# Patient Record
Sex: MALE | Race: WHITE | NOT HISPANIC OR LATINO | Employment: OTHER | URBAN - METROPOLITAN AREA
[De-identification: names, ages, dates, MRNs, and addresses within clinical notes are randomized per-mention and may not be internally consistent; named-entity substitution may affect disease eponyms.]

---

## 2017-09-12 ENCOUNTER — HISTORICAL (OUTPATIENT)
Dept: ADMINISTRATIVE | Facility: HOSPITAL | Age: 77
End: 2017-09-12

## 2017-10-17 ENCOUNTER — HISTORICAL (OUTPATIENT)
Dept: ADMINISTRATIVE | Facility: HOSPITAL | Age: 77
End: 2017-10-17

## 2019-01-25 ENCOUNTER — HISTORICAL (OUTPATIENT)
Dept: RADIOLOGY | Facility: HOSPITAL | Age: 79
End: 2019-01-25

## 2019-01-25 LAB
TSH SERPL-ACNC: 2.39 MIU/L (ref 0.36–3.74)
VIT B12 SERPL-MCNC: 1185 PG/ML (ref 193–986)

## 2019-03-28 ENCOUNTER — HISTORICAL (OUTPATIENT)
Dept: LAB | Facility: HOSPITAL | Age: 79
End: 2019-03-28

## 2019-03-28 LAB
ABS NEUT (OLG): 7.07 X10(3)/MCL (ref 2.1–9.2)
BASOPHILS # BLD AUTO: 0 X10(3)/MCL (ref 0–0.2)
BASOPHILS NFR BLD AUTO: 0 %
ERYTHROCYTE [DISTWIDTH] IN BLOOD BY AUTOMATED COUNT: 14.5 % (ref 11.5–17)
HCT VFR BLD AUTO: 45.1 % (ref 42–52)
HGB BLD-MCNC: 14.7 GM/DL (ref 14–18)
LYMPHOCYTES # BLD AUTO: 0.6 X10(3)/MCL (ref 0.6–4.6)
LYMPHOCYTES NFR BLD AUTO: 7 %
MCH RBC QN AUTO: 30.9 PG (ref 27–31)
MCHC RBC AUTO-ENTMCNC: 32.6 GM/DL (ref 33–36)
MCV RBC AUTO: 94.9 FL (ref 80–94)
MONOCYTES # BLD AUTO: 0.2 X10(3)/MCL (ref 0.1–1.3)
MONOCYTES NFR BLD AUTO: 2 %
NEUTROPHILS # BLD AUTO: 7.07 X10(3)/MCL (ref 2.1–9.2)
NEUTROPHILS NFR BLD AUTO: 90 %
PLATELET # BLD AUTO: 153 X10(3)/MCL (ref 130–400)
PMV BLD AUTO: 10.2 FL (ref 9.4–12.4)
RBC # BLD AUTO: 4.75 X10(6)/MCL (ref 4.7–6.1)
WBC # SPEC AUTO: 7.8 X10(3)/MCL (ref 4.5–11.5)

## 2022-04-07 ENCOUNTER — HISTORICAL (OUTPATIENT)
Dept: ADMINISTRATIVE | Facility: HOSPITAL | Age: 82
End: 2022-04-07
Payer: MEDICARE

## 2022-04-24 VITALS
HEIGHT: 72 IN | OXYGEN SATURATION: 96 % | SYSTOLIC BLOOD PRESSURE: 98 MMHG | WEIGHT: 190.69 LBS | BODY MASS INDEX: 25.83 KG/M2 | DIASTOLIC BLOOD PRESSURE: 68 MMHG

## 2022-04-30 NOTE — OP NOTE
Patient:   Asael Ramos            MRN: [id number]            FIN: [id number]               Age:   77 years     Sex:  Male     :  1940   Associated Diagnoses:   None   Author:   Praveen Kc MD       Phacoemulsification of Cataract with Intraocular Implant   Preoperative Diagnosis: Cataract right eye   Postoperative Diagnosis : Cataract right eye  Surgeon: Praveen Kc MD  Assistant: SELINA Peralta  Anestheisa: Topical  Complications: None  After the patient underwent topical anesthesia along with IV sedation in the holding area, the patient was brought to the operating suite. The patient was prepped and draped in a sterile fashion. A pediatric tegaderm and a lid speculum were used to retract the upper and lower lashes and lids.  A 1.0mm paracentesis was then made at the 11 oclock position. Intraocular non-preserved 1% Xylocaine (4% diluted down to 1%) was irrigated into the anterior chamber. Trypan blue dye was used to stain the anterior capsule then Endocoat was injected into the eye. A clear corneal incision was made with a 2.4 Keratome blade. A 4.75mm circular capsulotomy was then made with a pre bent 30 gauge needle and BSS was used to hydro dissect the nucleus from the capsule. The nucleus was then phacoemulsified with the Abbott machine for a EFX of 73. The cortex was then removed with the I/A hand piece and Helon was placed into the posterior bag of the eye. An posterior chamber implant [id number] of power 13.0 was placed in the capsular bag. The Helon was then removed from the eye with the I/A hand piece . The anterior chamber was inflated with BSS and the wound was checked for leaks. The lid speculum was removed and a drop of Besivance was placed in the operative eye. The patient was brought to the recovery suite in stable condition.         d.o.s. 2017 @ [facility] [place]

## 2022-04-30 NOTE — OP NOTE
Patient:   Asael Ramos            MRN: [id number]            FIN: [id number]               Age:   77 years     Sex:  Male     :  1940   Associated Diagnoses:   None   Author:   Praveen Kc MD       Phacoemulsification of Cataract with Intraocular Implant   Preoperative Diagnosis: Cataract left eye   Postoperative Diagnosis : Cataract left eye  Surgeon: Praveen Kc MD  Assistant: SELINA Peralta  Anestheisa: Topical  Complications: None    After the patient underwent topical anesthesia along with IV sedation in the holding area, the patient was brought to the operating suite. The patient prepped and draped in a sterile fashion. A pediatric tegaderm and a lid speculum were used to retract the upper and lower lashes and lids.  A 1.0mm paracentesis was then made at the 5 oclock and 11 oclock position. Intraocular non-preserved 1% Xylocaine (4% diluted down to 1%) was irrigated into the anterior chamber. Trypan blue dye was used to stain the anterior capsule then Endocoat was injected into the eye. A clear corneal incision was made with a 2.4 Keratome blade. A 4.75mm circular capsulotomy was then made with a pre bent 30 gauge needle and BSS was used to hydro dissect the nucleus from the capsule. The nucleus was then phacoemulsified with the Abbott machine for a EFX of 71. The cortex was then removed with the I/A hand piece and Helon was placed into the posterior bag of the eye. An posterior chamber implant [id number] of power 11.0 was placed in the capsular bag. The Helon was then removed from the eye with the I/A hand piece . The anterior chamber was inflated with BSS and the wound was checked for leaks. The lid speculum was removed and a drop of Besivance was placed in the operative eye. The patient was brought to the recovery suite in stable condition.         10/17/2017 @ [facility]

## 2022-08-25 ENCOUNTER — TELEPHONE (OUTPATIENT)
Dept: INTERNAL MEDICINE | Facility: CLINIC | Age: 82
End: 2022-08-25
Payer: MEDICARE

## 2022-08-29 ENCOUNTER — TELEPHONE (OUTPATIENT)
Dept: INTERNAL MEDICINE | Facility: CLINIC | Age: 82
End: 2022-08-29
Payer: MEDICARE

## 2022-08-29 NOTE — TELEPHONE ENCOUNTER
----- Message from Eliana Mcgrath sent at 8/29/2022 11:38 AM CDT -----  Regarding: needs to know if needs labs for next appt  Patient needs to know if he needs to go do labs before next appt. Call him at [phone]

## 2022-08-29 NOTE — TELEPHONE ENCOUNTER
Attempted to contact pt n/a, please let pt know no labs needed at this moment if  says otherwise. Thank you!

## 2022-10-10 DIAGNOSIS — Z12.5 SCREENING FOR PROSTATE CANCER: Primary | ICD-10-CM

## 2022-10-10 DIAGNOSIS — E78.5 HYPERLIPIDEMIA, UNSPECIFIED HYPERLIPIDEMIA TYPE: ICD-10-CM

## 2022-10-10 DIAGNOSIS — I10 HYPERTENSION, UNSPECIFIED TYPE: ICD-10-CM

## 2022-10-12 ENCOUNTER — TELEPHONE (OUTPATIENT)
Dept: INTERNAL MEDICINE | Facility: CLINIC | Age: 82
End: 2022-10-12
Payer: MEDICARE

## 2022-10-18 ENCOUNTER — LAB VISIT (OUTPATIENT)
Dept: LAB | Facility: HOSPITAL | Age: 82
End: 2022-10-18
Attending: INTERNAL MEDICINE
Payer: MEDICARE

## 2022-10-18 DIAGNOSIS — I10 HYPERTENSION, UNSPECIFIED TYPE: ICD-10-CM

## 2022-10-18 DIAGNOSIS — Z12.5 SCREENING FOR PROSTATE CANCER: ICD-10-CM

## 2022-10-18 DIAGNOSIS — E78.5 HYPERLIPIDEMIA, UNSPECIFIED HYPERLIPIDEMIA TYPE: ICD-10-CM

## 2022-10-18 LAB
ALBUMIN SERPL-MCNC: 3.8 GM/DL (ref 3.4–4.8)
ALBUMIN/GLOB SERPL: 1.4 RATIO (ref 1.1–2)
ALP SERPL-CCNC: 126 UNIT/L (ref 40–150)
ALT SERPL-CCNC: 25 UNIT/L (ref 0–55)
AST SERPL-CCNC: 30 UNIT/L (ref 5–34)
BASOPHILS # BLD AUTO: 0.04 X10(3)/MCL (ref 0–0.2)
BASOPHILS NFR BLD AUTO: 0.7 %
BILIRUBIN DIRECT+TOT PNL SERPL-MCNC: 1.6 MG/DL
BUN SERPL-MCNC: 15.5 MG/DL (ref 8.4–25.7)
CALCIUM SERPL-MCNC: 9.5 MG/DL (ref 8.8–10)
CHLORIDE SERPL-SCNC: 105 MMOL/L (ref 98–107)
CHOLEST SERPL-MCNC: 174 MG/DL
CHOLEST/HDLC SERPL: 4 {RATIO} (ref 0–5)
CO2 SERPL-SCNC: 26 MMOL/L (ref 23–31)
CREAT SERPL-MCNC: 0.95 MG/DL (ref 0.73–1.18)
EOSINOPHIL # BLD AUTO: 0.13 X10(3)/MCL (ref 0–0.9)
EOSINOPHIL NFR BLD AUTO: 2.4 %
ERYTHROCYTE [DISTWIDTH] IN BLOOD BY AUTOMATED COUNT: 14.9 % (ref 11.5–17)
GFR SERPLBLD CREATININE-BSD FMLA CKD-EPI: >60 MLS/MIN/1.73/M2
GLOBULIN SER-MCNC: 2.7 GM/DL (ref 2.4–3.5)
GLUCOSE SERPL-MCNC: 91 MG/DL (ref 82–115)
HCT VFR BLD AUTO: 43.2 % (ref 42–52)
HDLC SERPL-MCNC: 49 MG/DL (ref 35–60)
HGB BLD-MCNC: 13.9 GM/DL (ref 14–18)
IMM GRANULOCYTES # BLD AUTO: 0.02 X10(3)/MCL (ref 0–0.04)
IMM GRANULOCYTES NFR BLD AUTO: 0.4 %
LDLC SERPL CALC-MCNC: 111 MG/DL (ref 50–140)
LYMPHOCYTES # BLD AUTO: 1.22 X10(3)/MCL (ref 0.6–4.6)
LYMPHOCYTES NFR BLD AUTO: 22.3 %
MCH RBC QN AUTO: 31.1 PG (ref 27–31)
MCHC RBC AUTO-ENTMCNC: 32.2 MG/DL (ref 33–36)
MCV RBC AUTO: 96.6 FL (ref 80–94)
MONOCYTES # BLD AUTO: 0.47 X10(3)/MCL (ref 0.1–1.3)
MONOCYTES NFR BLD AUTO: 8.6 %
NEUTROPHILS # BLD AUTO: 3.6 X10(3)/MCL (ref 2.1–9.2)
NEUTROPHILS NFR BLD AUTO: 65.6 %
NRBC BLD AUTO-RTO: 0 %
PLATELET # BLD AUTO: 125 X10(3)/MCL (ref 130–400)
PMV BLD AUTO: 10.3 FL (ref 7.4–10.4)
POTASSIUM SERPL-SCNC: 4 MMOL/L (ref 3.5–5.1)
PROT SERPL-MCNC: 6.5 GM/DL (ref 5.8–7.6)
PSA SERPL-MCNC: 6.06 NG/ML
RBC # BLD AUTO: 4.47 X10(6)/MCL (ref 4.7–6.1)
SODIUM SERPL-SCNC: 138 MMOL/L (ref 136–145)
TRIGL SERPL-MCNC: 72 MG/DL (ref 34–140)
TSH SERPL-ACNC: 3.03 UIU/ML (ref 0.35–4.94)
VLDLC SERPL CALC-MCNC: 14 MG/DL
WBC # SPEC AUTO: 5.5 X10(3)/MCL (ref 4.5–11.5)

## 2022-10-18 PROCEDURE — 80053 COMPREHEN METABOLIC PANEL: CPT

## 2022-10-18 PROCEDURE — 84153 ASSAY OF PSA TOTAL: CPT

## 2022-10-18 PROCEDURE — 36415 COLL VENOUS BLD VENIPUNCTURE: CPT

## 2022-10-18 PROCEDURE — 85025 COMPLETE CBC W/AUTO DIFF WBC: CPT

## 2022-10-18 PROCEDURE — 84443 ASSAY THYROID STIM HORMONE: CPT

## 2022-10-18 PROCEDURE — 80061 LIPID PANEL: CPT

## 2022-10-19 ENCOUNTER — OFFICE VISIT (OUTPATIENT)
Dept: INTERNAL MEDICINE | Facility: CLINIC | Age: 82
End: 2022-10-19
Payer: MEDICARE

## 2022-10-19 VITALS
TEMPERATURE: 98 F | HEART RATE: 79 BPM | DIASTOLIC BLOOD PRESSURE: 72 MMHG | SYSTOLIC BLOOD PRESSURE: 114 MMHG | OXYGEN SATURATION: 97 % | HEIGHT: 71 IN | WEIGHT: 189 LBS | BODY MASS INDEX: 26.46 KG/M2

## 2022-10-19 DIAGNOSIS — Z23 NEED FOR VACCINATION: Primary | ICD-10-CM

## 2022-10-19 DIAGNOSIS — L29.9 ITCHING: Primary | ICD-10-CM

## 2022-10-19 DIAGNOSIS — E78.2 MIXED HYPERLIPIDEMIA: ICD-10-CM

## 2022-10-19 DIAGNOSIS — I48.0 PAROXYSMAL ATRIAL FIBRILLATION: ICD-10-CM

## 2022-10-19 PROCEDURE — 90694 FLU VACCINE - QUADRIVALENT - ADJUVANTED: ICD-10-PCS | Mod: ,,, | Performed by: INTERNAL MEDICINE

## 2022-10-19 PROCEDURE — 3288F FALL RISK ASSESSMENT DOCD: CPT | Mod: CPTII,,, | Performed by: INTERNAL MEDICINE

## 2022-10-19 PROCEDURE — 1126F AMNT PAIN NOTED NONE PRSNT: CPT | Mod: CPTII,,, | Performed by: INTERNAL MEDICINE

## 2022-10-19 PROCEDURE — 1159F PR MEDICATION LIST DOCUMENTED IN MEDICAL RECORD: ICD-10-PCS | Mod: CPTII,,, | Performed by: INTERNAL MEDICINE

## 2022-10-19 PROCEDURE — 3074F SYST BP LT 130 MM HG: CPT | Mod: CPTII,,, | Performed by: INTERNAL MEDICINE

## 2022-10-19 PROCEDURE — 1101F PT FALLS ASSESS-DOCD LE1/YR: CPT | Mod: CPTII,,, | Performed by: INTERNAL MEDICINE

## 2022-10-19 PROCEDURE — 3288F PR FALLS RISK ASSESSMENT DOCUMENTED: ICD-10-PCS | Mod: CPTII,,, | Performed by: INTERNAL MEDICINE

## 2022-10-19 PROCEDURE — G0008 ADMIN INFLUENZA VIRUS VAC: HCPCS | Mod: ,,, | Performed by: INTERNAL MEDICINE

## 2022-10-19 PROCEDURE — G0008 FLU VACCINE - QUADRIVALENT - ADJUVANTED: ICD-10-PCS | Mod: ,,, | Performed by: INTERNAL MEDICINE

## 2022-10-19 PROCEDURE — 1160F PR REVIEW ALL MEDS BY PRESCRIBER/CLIN PHARMACIST DOCUMENTED: ICD-10-PCS | Mod: CPTII,,, | Performed by: INTERNAL MEDICINE

## 2022-10-19 PROCEDURE — 3074F PR MOST RECENT SYSTOLIC BLOOD PRESSURE < 130 MM HG: ICD-10-PCS | Mod: CPTII,,, | Performed by: INTERNAL MEDICINE

## 2022-10-19 PROCEDURE — 99214 OFFICE O/P EST MOD 30 MIN: CPT | Mod: ,,, | Performed by: INTERNAL MEDICINE

## 2022-10-19 PROCEDURE — 1160F RVW MEDS BY RX/DR IN RCRD: CPT | Mod: CPTII,,, | Performed by: INTERNAL MEDICINE

## 2022-10-19 PROCEDURE — 1126F PR PAIN SEVERITY QUANTIFIED, NO PAIN PRESENT: ICD-10-PCS | Mod: CPTII,,, | Performed by: INTERNAL MEDICINE

## 2022-10-19 PROCEDURE — 99214 PR OFFICE/OUTPT VISIT, EST, LEVL IV, 30-39 MIN: ICD-10-PCS | Mod: ,,, | Performed by: INTERNAL MEDICINE

## 2022-10-19 PROCEDURE — 3078F PR MOST RECENT DIASTOLIC BLOOD PRESSURE < 80 MM HG: ICD-10-PCS | Mod: CPTII,,, | Performed by: INTERNAL MEDICINE

## 2022-10-19 PROCEDURE — 3078F DIAST BP <80 MM HG: CPT | Mod: CPTII,,, | Performed by: INTERNAL MEDICINE

## 2022-10-19 PROCEDURE — 1101F PR PT FALLS ASSESS DOC 0-1 FALLS W/OUT INJ PAST YR: ICD-10-PCS | Mod: CPTII,,, | Performed by: INTERNAL MEDICINE

## 2022-10-19 PROCEDURE — 90694 VACC AIIV4 NO PRSRV 0.5ML IM: CPT | Mod: ,,, | Performed by: INTERNAL MEDICINE

## 2022-10-19 PROCEDURE — 1159F MED LIST DOCD IN RCRD: CPT | Mod: CPTII,,, | Performed by: INTERNAL MEDICINE

## 2022-10-19 RX ORDER — HYDROXYZINE HYDROCHLORIDE 10 MG/1
10 TABLET, FILM COATED ORAL 3 TIMES DAILY PRN
Qty: 90 TABLET | Refills: 5 | Status: SHIPPED | OUTPATIENT
Start: 2022-10-19

## 2022-10-19 NOTE — ASSESSMENT & PLAN NOTE
Not on a statin, labs reviewed and noted to be essentially normal  Stressed importance of dietary modifications. Follow a low cholesterol, low saturated fat diet with less that 200mg of cholesterol a day.  Avoid fried foods and high saturated fats (high saturated fats less than 7% of calories).  Add Flax Seed/Fish Oil supplements to diet. Increase dietary fiber.  Regular exercise can reduce LDL and raise HDL. Stressed importance of physical activity 5 times per week for 30 minutes per day.

## 2022-10-19 NOTE — PROGRESS NOTES
Subjective:      Patient ID: Asael Ramos is a 82 y.o. male.    Chief Complaint: Follow-up (9 month)    Mr Vyas, (Smokey) is a 82-year-old gentleman with atrial fibrillation, anticoagulated with Eliquis and MMSE score of 21/30.    Doing well overall and has no acute needs or complaints.  Did not do very well with the Keytruda and is advised to follow-up with his oncologist regarding this.    Records from Oncology will be obtained       ENT: Dr. Wheeler  Oncologist: Dr. Acosta  Dermatology: Dr. Keith  Neurologist: Dr. Richmond  Cardiologist: Dr. Sharif    Wellness:  01/18/2022  Pn: 1029,2017: UTD    The patient's Health Maintenance was reviewed and the following appears to be due at this time:   There are no preventive care reminders to display for this patient.     Past Medical History:  Past Medical History:   Diagnosis Date    A-fib     Brain concussion     CAD (coronary artery disease)     Malignant melanoma of skin, unspecified     Mild cognitive impairment     Mixed hyperlipidemia     Pulmonary hypertension     Shingles     Sleep apnea, unspecified      Past Surgical History:   Procedure Laterality Date    CATARACT EXTRACTION      CORONARY STENT PLACEMENT      HIP REPLACEMENT ARTHROPLASTY      TOTAL SHOULDER ARTHROPLASTY Bilateral      Review of patient's allergies indicates:  No Known Allergies  Social History     Socioeconomic History    Marital status:    Tobacco Use    Smoking status: Never    Smokeless tobacco: Never   Substance and Sexual Activity    Alcohol use: Yes     Alcohol/week: 2.0 standard drinks     Types: 1 Glasses of wine, 1 Cans of beer per week     Family History   Problem Relation Age of Onset    Breast cancer Mother     Cancer Brother        Review of Systems    A comprehensive review of systems was performed and is negative except for that stated above  Objective:   /72 (BP Location: Left arm, Patient Position: Sitting, BP Method: Small (Manual))   Pulse 79   Temp 98.4  "°F (36.9 °C) (Temporal)   Ht 5' 11" (1.803 m)   Wt 85.7 kg (189 lb)   SpO2 97%   BMI 26.36 kg/m²     Physical Exam  Constitutional:       Appearance: Normal appearance.   HENT:      Head: Normocephalic and atraumatic.      Nose: Nose normal.      Mouth/Throat:      Mouth: Mucous membranes are moist.      Pharynx: Oropharynx is clear.   Eyes:      Extraocular Movements: Extraocular movements intact.      Pupils: Pupils are equal, round, and reactive to light.   Cardiovascular:      Rate and Rhythm: Normal rate and regular rhythm.      Pulses: Normal pulses.   Pulmonary:      Effort: Pulmonary effort is normal.      Breath sounds: Normal breath sounds.   Abdominal:      General: Bowel sounds are normal.      Palpations: Abdomen is soft.   Musculoskeletal:         General: Normal range of motion.      Cervical back: Normal range of motion and neck supple.   Skin:     General: Skin is warm.   Neurological:      General: No focal deficit present.      Mental Status: He is alert and oriented to person, place, and time. Mental status is at baseline.   Psychiatric:         Mood and Affect: Mood normal.     Assessment/ Plan:   1. Need for vaccination  -     Influenza - Quadrivalent (Adjuvanted)    2. Paroxysmal atrial fibrillation  Assessment & Plan:  -on Eliquis 5 mg p.o. b.i.d. now   -rate and rhythm controlled      3. Mixed hyperlipidemia  Assessment & Plan:    Not on a statin, labs reviewed and noted to be essentially normal  Stressed importance of dietary modifications. Follow a low cholesterol, low saturated fat diet with less that 200mg of cholesterol a day.  Avoid fried foods and high saturated fats (high saturated fats less than 7% of calories).  Add Flax Seed/Fish Oil supplements to diet. Increase dietary fiber.  Regular exercise can reduce LDL and raise HDL. Stressed importance of physical activity 5 times per week for 30 minutes per day.        .  "

## 2022-10-19 NOTE — LETTER
AUTHORIZATION FOR RELEASE OF   CONFIDENTIAL INFORMATION    Dear Dr. Acosta    We are seeing Asael Ramos, date of birth 1940, in the clinic at [place] [address] [facility]. Aline Montoya MD is the patient's PCP. Asael Ramos has an outstanding lab/procedure at the time we reviewed his chart. In order to help keep his health information updated, he has authorized us to request the following medical record(s):        (  )  MAMMOGRAM                                      (  )  COLONOSCOPY      (  )  PAP SMEAR                                          (X)  OUTSIDE LAB RESULTS     (  )  DEXA SCAN                                          (  )  EYE EXAM            (  )  FOOT EXAM                                          (  )  ENTIRE RECORD     (  )  OUTSIDE IMMUNIZATIONS                 (X)  OFFICE NOTES         Please fax records to [facility], Reshma Arun Bhanushali, MD, [phone]               Patient Name: Asael Ramos  : 1940  Patient Phone #: [phone]

## 2022-12-28 ENCOUNTER — HOSPITAL ENCOUNTER (INPATIENT)
Facility: HOSPITAL | Age: 82
LOS: 8 days | Discharge: HOME HEALTH | DRG: 177 | End: 2023-01-05
Attending: INTERNAL MEDICINE | Admitting: INTERNAL MEDICINE
Payer: MEDICARE

## 2022-12-28 DIAGNOSIS — R79.89 ELEVATED TROPONIN: ICD-10-CM

## 2022-12-28 DIAGNOSIS — U07.1 COVID-19: Primary | ICD-10-CM

## 2022-12-28 DIAGNOSIS — R53.1 WEAKNESS: ICD-10-CM

## 2022-12-28 DIAGNOSIS — S32.018A OTHER CLOSED FRACTURE OF FIRST LUMBAR VERTEBRA, INITIAL ENCOUNTER: ICD-10-CM

## 2022-12-28 DIAGNOSIS — I48.0 PAROXYSMAL ATRIAL FIBRILLATION: ICD-10-CM

## 2022-12-28 DIAGNOSIS — I48.91 ATRIAL FIBRILLATION: ICD-10-CM

## 2022-12-28 DIAGNOSIS — U07.1 COVID-19 VIRUS INFECTION: ICD-10-CM

## 2022-12-28 DIAGNOSIS — S09.90XA INJURY OF HEAD, INITIAL ENCOUNTER: ICD-10-CM
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
## 2022-12-28 LAB
ALBUMIN SERPL-MCNC: 3.5 G/DL (ref 3.4–4.8)
ALBUMIN/GLOB SERPL: 1.3 RATIO (ref 1.1–2)
ALP SERPL-CCNC: 106 UNIT/L (ref 40–150)
ALT SERPL-CCNC: 31 UNIT/L (ref 0–55)
AMPHET UR QL SCN: NEGATIVE
APPEARANCE UR: CLEAR
APTT PPP: 31.8 SECONDS (ref 23.2–33.7)
AST SERPL-CCNC: 61 UNIT/L (ref 5–34)
BACTERIA #/AREA URNS AUTO: NORMAL /HPF
BARBITURATE SCN PRESENT UR: NEGATIVE
BASOPHILS # BLD AUTO: 0.01 X10(3)/MCL (ref 0–0.2)
BASOPHILS NFR BLD AUTO: 0.2 %
BENZODIAZ UR QL SCN: NEGATIVE
BILIRUB UR QL STRIP.AUTO: NEGATIVE MG/DL
BILIRUBIN DIRECT+TOT PNL SERPL-MCNC: 1.2 MG/DL
BUN SERPL-MCNC: 18.4 MG/DL (ref 8.4–25.7)
CALCIUM SERPL-MCNC: 9 MG/DL (ref 8.8–10)
CANNABINOIDS UR QL SCN: NEGATIVE
CHLORIDE SERPL-SCNC: 113 MMOL/L (ref 98–107)
CO2 SERPL-SCNC: 18 MMOL/L (ref 23–31)
COCAINE UR QL SCN: NEGATIVE
COLOR UR AUTO: ABNORMAL
CREAT SERPL-MCNC: 0.91 MG/DL (ref 0.73–1.18)
EOSINOPHIL # BLD AUTO: 0 X10(3)/MCL (ref 0–0.9)
EOSINOPHIL NFR BLD AUTO: 0 %
ERYTHROCYTE [DISTWIDTH] IN BLOOD BY AUTOMATED COUNT: 15.5 % (ref 11.6–14.4)
FENTANYL UR QL SCN: NEGATIVE
GFR SERPLBLD CREATININE-BSD FMLA CKD-EPI: >60 MLS/MIN/1.73/M2
GLOBULIN SER-MCNC: 2.7 GM/DL (ref 2.4–3.5)
GLUCOSE SERPL-MCNC: 110 MG/DL (ref 82–115)
GLUCOSE UR QL STRIP.AUTO: NEGATIVE MG/DL
HCT VFR BLD AUTO: 39.2 % (ref 42–52)
HGB BLD-MCNC: 12.6 GM/DL (ref 14–18)
IMM GRANULOCYTES # BLD AUTO: 0.02 X10(3)/MCL (ref 0–0.04)
IMM GRANULOCYTES NFR BLD AUTO: 0.3 %
INR BLD: 1.51 (ref 0–1.3)
KETONES UR QL STRIP.AUTO: ABNORMAL MG/DL
LACTATE SERPL-SCNC: 1.9 MMOL/L (ref 0.5–2.2)
LACTATE SERPL-SCNC: 2.1 MMOL/L (ref 0.5–2.2)
LEUKOCYTE ESTERASE UR QL STRIP.AUTO: NEGATIVE UNIT/L
LYMPHOCYTES # BLD AUTO: 0.61 X10(3)/MCL (ref 0.6–4.6)
LYMPHOCYTES NFR BLD AUTO: 10.6 %
MAGNESIUM SERPL-MCNC: 2 MG/DL (ref 1.6–2.6)
MCH RBC QN AUTO: 31 PG
MCHC RBC AUTO-ENTMCNC: 32.1 MG/DL (ref 33–36)
MCV RBC AUTO: 96.3 FL (ref 80–94)
MDMA UR QL SCN: NEGATIVE
MONOCYTES # BLD AUTO: 0.53 X10(3)/MCL (ref 0.1–1.3)
MONOCYTES NFR BLD AUTO: 9.2 %
NEUTROPHILS # BLD AUTO: 4.6 X10(3)/MCL (ref 2.1–9.2)
NEUTROPHILS NFR BLD AUTO: 79.7 %
NITRITE UR QL STRIP.AUTO: NEGATIVE
NRBC BLD AUTO-RTO: 0 % (ref 0–1)
OPIATES UR QL SCN: NEGATIVE
PCP UR QL: NEGATIVE
PH UR STRIP.AUTO: 5.5 [PH]
PH UR: 5.5 [PH] (ref 3–11)
PLATELET # BLD AUTO: 86 X10(3)/MCL (ref 140–371)
PMV BLD AUTO: 11 FL (ref 9.4–12.4)
POTASSIUM SERPL-SCNC: 4.2 MMOL/L (ref 3.5–5.1)
PROT SERPL-MCNC: 6.2 GM/DL (ref 5.8–7.6)
PROT UR QL STRIP.AUTO: ABNORMAL MG/DL
PROTHROMBIN TIME: 18 SECONDS (ref 12.5–14.5)
RBC # BLD AUTO: 4.07 X10(6)/MCL (ref 4.7–6.1)
RBC #/AREA URNS AUTO: <5 /HPF
RBC UR QL AUTO: ABNORMAL UNIT/L
SARS-COV-2 RDRP RESP QL NAA+PROBE: POSITIVE
SODIUM SERPL-SCNC: 142 MMOL/L (ref 136–145)
SP GR UR STRIP.AUTO: 1.02 (ref 1–1.03)
SPECIFIC GRAVITY, URINE AUTO (.000) (OHS): 1.02 (ref 1–1.03)
SQUAMOUS #/AREA URNS AUTO: <5 /HPF
TROPONIN I SERPL-MCNC: 0.17 NG/ML (ref 0–0.04)
TROPONIN I SERPL-MCNC: 0.21 NG/ML (ref 0–0.04)
UROBILINOGEN UR STRIP-ACNC: 1 MG/DL
WBC # SPEC AUTO: 5.8 X10(3)/MCL (ref 4.5–11.5)
WBC #/AREA URNS AUTO: 5 /HPF

## 2022-12-28 PROCEDURE — 63600175 PHARM REV CODE 636 W HCPCS: Performed by: NURSE PRACTITIONER

## 2022-12-28 PROCEDURE — 93010 ELECTROCARDIOGRAM REPORT: CPT | Mod: ,,, | Performed by: INTERNAL MEDICINE

## 2022-12-28 PROCEDURE — 83735 ASSAY OF MAGNESIUM: CPT | Performed by: NURSE PRACTITIONER

## 2022-12-28 PROCEDURE — 85610 PROTHROMBIN TIME: CPT | Performed by: NURSE PRACTITIONER

## 2022-12-28 PROCEDURE — 25000003 PHARM REV CODE 250: Performed by: NURSE PRACTITIONER

## 2022-12-28 PROCEDURE — 93010 EKG 12-LEAD: ICD-10-PCS | Mod: ,,, | Performed by: INTERNAL MEDICINE

## 2022-12-28 PROCEDURE — 21400001 HC TELEMETRY ROOM

## 2022-12-28 PROCEDURE — 85730 THROMBOPLASTIN TIME PARTIAL: CPT | Performed by: NURSE PRACTITIONER

## 2022-12-28 PROCEDURE — 80307 DRUG TEST PRSMV CHEM ANLYZR: CPT | Performed by: NURSE PRACTITIONER

## 2022-12-28 PROCEDURE — 27000207 HC ISOLATION

## 2022-12-28 PROCEDURE — 96361 HYDRATE IV INFUSION ADD-ON: CPT

## 2022-12-28 PROCEDURE — 96374 THER/PROPH/DIAG INJ IV PUSH: CPT

## 2022-12-28 PROCEDURE — 87635 SARS-COV-2 COVID-19 AMP PRB: CPT | Performed by: NURSE PRACTITIONER

## 2022-12-28 PROCEDURE — 11000001 HC ACUTE MED/SURG PRIVATE ROOM

## 2022-12-28 PROCEDURE — 80053 COMPREHEN METABOLIC PANEL: CPT | Performed by: NURSE PRACTITIONER

## 2022-12-28 PROCEDURE — 81001 URINALYSIS AUTO W/SCOPE: CPT | Performed by: NURSE PRACTITIONER

## 2022-12-28 PROCEDURE — 85025 COMPLETE CBC W/AUTO DIFF WBC: CPT | Performed by: NURSE PRACTITIONER

## 2022-12-28 PROCEDURE — 83605 ASSAY OF LACTIC ACID: CPT | Performed by: NURSE PRACTITIONER

## 2022-12-28 PROCEDURE — 99285 EMERGENCY DEPT VISIT HI MDM: CPT | Mod: 25

## 2022-12-28 PROCEDURE — 84484 ASSAY OF TROPONIN QUANT: CPT | Performed by: NURSE PRACTITIONER

## 2022-12-28 PROCEDURE — 93005 ELECTROCARDIOGRAM TRACING: CPT

## 2022-12-28 RX ORDER — ONDANSETRON 2 MG/ML
4 INJECTION INTRAMUSCULAR; INTRAVENOUS EVERY 6 HOURS PRN
Status: DISCONTINUED | OUTPATIENT
Start: 2022-12-28 | End: 2023-01-05 | Stop reason: HOSPADM

## 2022-12-28 RX ORDER — MORPHINE SULFATE 4 MG/ML
4 INJECTION, SOLUTION INTRAMUSCULAR; INTRAVENOUS EVERY 4 HOURS PRN
Status: DISCONTINUED | OUTPATIENT
Start: 2022-12-28 | End: 2022-12-29

## 2022-12-28 RX ORDER — MORPHINE SULFATE 4 MG/ML
4 INJECTION, SOLUTION INTRAMUSCULAR; INTRAVENOUS
Status: COMPLETED | OUTPATIENT
Start: 2022-12-28 | End: 2022-12-28

## 2022-12-28 RX ORDER — HYDROCODONE BITARTRATE AND ACETAMINOPHEN 5; 325 MG/1; MG/1
1 TABLET ORAL
Status: DISPENSED | OUTPATIENT
Start: 2022-12-28 | End: 2022-12-29

## 2022-12-28 RX ORDER — SODIUM CHLORIDE 9 MG/ML
500 INJECTION, SOLUTION INTRAVENOUS
Status: COMPLETED | OUTPATIENT
Start: 2022-12-28 | End: 2022-12-28

## 2022-12-28 RX ORDER — SODIUM CHLORIDE 9 MG/ML
1000 INJECTION, SOLUTION INTRAVENOUS
Status: COMPLETED | OUTPATIENT
Start: 2022-12-28 | End: 2022-12-28

## 2022-12-28 RX ORDER — HYDROXYZINE HYDROCHLORIDE 10 MG/1
10 TABLET, FILM COATED ORAL 3 TIMES DAILY PRN
Status: DISCONTINUED | OUTPATIENT
Start: 2022-12-28 | End: 2023-01-05 | Stop reason: HOSPADM

## 2022-12-28 RX ORDER — LANOLIN ALCOHOL/MO/W.PET/CERES
400 CREAM (GRAM) TOPICAL DAILY
Status: DISCONTINUED | OUTPATIENT
Start: 2022-12-29 | End: 2022-12-29

## 2022-12-28 RX ORDER — MORPHINE SULFATE 4 MG/ML
2 INJECTION, SOLUTION INTRAMUSCULAR; INTRAVENOUS
Status: COMPLETED | OUTPATIENT
Start: 2022-12-28 | End: 2022-12-28

## 2022-12-28 RX ADMIN — MORPHINE SULFATE 4 MG: 4 INJECTION INTRAVENOUS at 07:12

## 2022-12-28 RX ADMIN — SODIUM CHLORIDE 500 ML: 9 INJECTION, SOLUTION INTRAVENOUS at 03:12

## 2022-12-28 RX ADMIN — MORPHINE SULFATE 4 MG: 4 INJECTION INTRAVENOUS at 11:12

## 2022-12-28 RX ADMIN — MORPHINE SULFATE 2 MG: 4 INJECTION INTRAVENOUS at 04:12

## 2022-12-28 RX ADMIN — SODIUM CHLORIDE 1000 ML: 9 INJECTION, SOLUTION INTRAVENOUS at 05:12

## 2022-12-28 RX ADMIN — SODIUM CHLORIDE 500 ML: 9 INJECTION, SOLUTION INTRAVENOUS at 04:12

## 2022-12-28 NOTE — ED TRIAGE NOTES
Pt fell out bed last night, unwitnessed. LOC unknown. +blood thinners, unknown if pt hit head. No obvious trauma. Hx of dementia. Pt had fever yesterday no fever at this time. Denies numbness or tingling. - headache. Denies chest pain sob

## 2022-12-28 NOTE — Clinical Note
Diagnosis: Weakness [408829]   Admitting Provider:: ROBBIE MARRERO [[id number]]   Future Attending Provider: ROBBIE MARRERO [[id number]]   Reason for IP Medical Treatment  (Clinical interventions that can only be accomplished in the IP setting? ) :: fluids   Estimated Length of Stay:: 2 midnights   I certify that Inpatient services for greater than or equal to 2 midnights are medically necessary:: Yes   Plans for Post-Acute care--if anticipated (pick the single best option):: A. No post acute care anticipated at this time   Special Needs:: No Special Needs [1]

## 2022-12-29 LAB
ALBUMIN SERPL-MCNC: 3.5 G/DL (ref 3.4–4.8)
ALBUMIN/GLOB SERPL: 1.3 RATIO (ref 1.1–2)
ALP SERPL-CCNC: 101 UNIT/L (ref 40–150)
ALT SERPL-CCNC: 42 UNIT/L (ref 0–55)
AST SERPL-CCNC: 95 UNIT/L (ref 5–34)
AV INDEX (PROSTH): 0.44
AV MEAN GRADIENT: 4 MMHG
AV PEAK GRADIENT: 9 MMHG
AV VELOCITY RATIO: 0.37
BASOPHILS # BLD AUTO: 0.01 X10(3)/MCL (ref 0–0.2)
BASOPHILS NFR BLD AUTO: 0.2 %
BILIRUBIN DIRECT+TOT PNL SERPL-MCNC: 1.1 MG/DL
BSA FOR ECHO PROCEDURE: 2.02 M2
BUN SERPL-MCNC: 20.4 MG/DL (ref 8.4–25.7)
CALCIUM SERPL-MCNC: 9 MG/DL (ref 8.8–10)
CHLORIDE SERPL-SCNC: 112 MMOL/L (ref 98–107)
CO2 SERPL-SCNC: 16 MMOL/L (ref 23–31)
CREAT SERPL-MCNC: 0.87 MG/DL (ref 0.73–1.18)
CV ECHO LV RWT: 0.8 CM
DOP CALC AO PEAK VEL: 1.53 M/S
DOP CALC AO VTI: 25.9 CM
DOP CALC LVOT PEAK VEL: 0.56 M/S
DOP CALCLVOT PEAK VEL VTI: 11.4 CM
E/E' RATIO: 7.05 M/S
ECHO LV POSTERIOR WALL: 1.7 CM (ref 0.6–1.1)
EJECTION FRACTION: 55 %
EOSINOPHIL # BLD AUTO: 0 X10(3)/MCL (ref 0–0.9)
EOSINOPHIL NFR BLD AUTO: 0 %
ERYTHROCYTE [DISTWIDTH] IN BLOOD BY AUTOMATED COUNT: 15.5 % (ref 11.6–14.4)
FRACTIONAL SHORTENING: 15 % (ref 28–44)
GFR SERPLBLD CREATININE-BSD FMLA CKD-EPI: >60 MLS/MIN/1.73/M2
GLOBULIN SER-MCNC: 2.7 GM/DL (ref 2.4–3.5)
GLUCOSE SERPL-MCNC: 110 MG/DL (ref 82–115)
HCT VFR BLD AUTO: 40.5 % (ref 42–52)
HGB BLD-MCNC: 13.2 GM/DL (ref 14–18)
IMM GRANULOCYTES # BLD AUTO: 0.02 X10(3)/MCL (ref 0–0.04)
IMM GRANULOCYTES NFR BLD AUTO: 0.3 %
INTERVENTRICULAR SEPTUM: 1.63 CM (ref 0.6–1.1)
LEFT ATRIUM SIZE: 4.8 CM
LEFT ATRIUM VOLUME INDEX MOD: 36.3 ML/M2
LEFT ATRIUM VOLUME MOD: 73.7 CM3
LEFT INTERNAL DIMENSION IN SYSTOLE: 3.61 CM (ref 2.1–4)
LEFT VENTRICLE DIASTOLIC VOLUME INDEX: 40.05 ML/M2
LEFT VENTRICLE DIASTOLIC VOLUME: 81.3 ML
LEFT VENTRICLE MASS INDEX: 148 G/M2
LEFT VENTRICLE SYSTOLIC VOLUME INDEX: 27 ML/M2
LEFT VENTRICLE SYSTOLIC VOLUME: 54.8 ML
LEFT VENTRICULAR INTERNAL DIMENSION IN DIASTOLE: 4.26 CM (ref 3.5–6)
LEFT VENTRICULAR MASS: 300.07 G
LV LATERAL E/E' RATIO: 6.09 M/S
LV SEPTAL E/E' RATIO: 8.38 M/S
LVOT MG: 1 MMHG
LVOT MV: 0.32 CM/S
LYMPHOCYTES # BLD AUTO: 0.96 X10(3)/MCL (ref 0.6–4.6)
LYMPHOCYTES NFR BLD AUTO: 15 %
MCH RBC QN AUTO: 31.1 PG
MCHC RBC AUTO-ENTMCNC: 32.6 MG/DL (ref 33–36)
MCV RBC AUTO: 95.5 FL (ref 80–94)
MONOCYTES # BLD AUTO: 0.83 X10(3)/MCL (ref 0.1–1.3)
MONOCYTES NFR BLD AUTO: 13 %
MV PEAK E VEL: 0.67 M/S
NEUTROPHILS # BLD AUTO: 4.58 X10(3)/MCL (ref 2.1–9.2)
NEUTROPHILS NFR BLD AUTO: 71.5 %
NRBC BLD AUTO-RTO: 0 % (ref 0–1)
PISA TR MAX VEL: 2.12 M/S
PLATELET # BLD AUTO: 83 X10(3)/MCL (ref 140–371)
PMV BLD AUTO: 10.6 FL (ref 9.4–12.4)
POCT GLUCOSE: 99 MG/DL (ref 70–110)
POTASSIUM SERPL-SCNC: 4.2 MMOL/L (ref 3.5–5.1)
PROT SERPL-MCNC: 6.2 GM/DL (ref 5.8–7.6)
RA PRESSURE: 8 MMHG
RBC # BLD AUTO: 4.24 X10(6)/MCL (ref 4.7–6.1)
RIGHT VENTRICULAR END-DIASTOLIC DIMENSION: 5.12 CM
SODIUM SERPL-SCNC: 140 MMOL/L (ref 136–145)
TDI LATERAL: 0.11 M/S
TDI SEPTAL: 0.08 M/S
TDI: 0.1 M/S
TR MAX PG: 18 MMHG
TROPONIN I SERPL-MCNC: 0.16 NG/ML (ref 0–0.04)
TROPONIN I SERPL-MCNC: 0.18 NG/ML (ref 0–0.04)
TV REST PULMONARY ARTERY PRESSURE: 26 MMHG
WBC # SPEC AUTO: 6.4 X10(3)/MCL (ref 4.5–11.5)

## 2022-12-29 PROCEDURE — 99223 1ST HOSP IP/OBS HIGH 75: CPT | Mod: ,,, | Performed by: NEUROLOGICAL SURGERY

## 2022-12-29 PROCEDURE — 27000207 HC ISOLATION

## 2022-12-29 PROCEDURE — 99223 PR INITIAL HOSPITAL CARE,LEVL III: ICD-10-PCS | Mod: ,,, | Performed by: NEUROLOGICAL SURGERY

## 2022-12-29 PROCEDURE — 85025 COMPLETE CBC W/AUTO DIFF WBC: CPT | Performed by: NURSE PRACTITIONER

## 2022-12-29 PROCEDURE — 25000003 PHARM REV CODE 250: Performed by: NURSE PRACTITIONER

## 2022-12-29 PROCEDURE — 63600175 PHARM REV CODE 636 W HCPCS: Performed by: INTERNAL MEDICINE

## 2022-12-29 PROCEDURE — 21400001 HC TELEMETRY ROOM

## 2022-12-29 PROCEDURE — 84484 ASSAY OF TROPONIN QUANT: CPT | Performed by: NURSE PRACTITIONER

## 2022-12-29 PROCEDURE — C9113 INJ PANTOPRAZOLE SODIUM, VIA: HCPCS | Performed by: INTERNAL MEDICINE

## 2022-12-29 PROCEDURE — 80053 COMPREHEN METABOLIC PANEL: CPT | Performed by: NURSE PRACTITIONER

## 2022-12-29 PROCEDURE — 36415 COLL VENOUS BLD VENIPUNCTURE: CPT | Performed by: NURSE PRACTITIONER

## 2022-12-29 PROCEDURE — 25000003 PHARM REV CODE 250: Performed by: INTERNAL MEDICINE

## 2022-12-29 PROCEDURE — 63600175 PHARM REV CODE 636 W HCPCS: Performed by: NURSE PRACTITIONER

## 2022-12-29 PROCEDURE — 99223 PR INITIAL HOSPITAL CARE,LEVL III: ICD-10-PCS | Mod: ,,, | Performed by: INTERNAL MEDICINE

## 2022-12-29 PROCEDURE — 99223 1ST HOSP IP/OBS HIGH 75: CPT | Mod: ,,, | Performed by: INTERNAL MEDICINE

## 2022-12-29 RX ORDER — KETOROLAC TROMETHAMINE 30 MG/ML
15 INJECTION, SOLUTION INTRAMUSCULAR; INTRAVENOUS EVERY 6 HOURS PRN
Status: DISPENSED | OUTPATIENT
Start: 2022-12-29 | End: 2023-01-01

## 2022-12-29 RX ORDER — NALOXONE HCL 0.4 MG/ML
VIAL (ML) INJECTION
Status: DISPENSED
Start: 2022-12-29 | End: 2022-12-29

## 2022-12-29 RX ORDER — PANTOPRAZOLE SODIUM 40 MG/10ML
40 INJECTION, POWDER, LYOPHILIZED, FOR SOLUTION INTRAVENOUS DAILY
Status: DISCONTINUED | OUTPATIENT
Start: 2022-12-29 | End: 2023-01-05 | Stop reason: HOSPADM

## 2022-12-29 RX ORDER — ENOXAPARIN SODIUM 100 MG/ML
80 INJECTION SUBCUTANEOUS
Status: DISCONTINUED | OUTPATIENT
Start: 2022-12-29 | End: 2023-01-03

## 2022-12-29 RX ORDER — MUPIROCIN 20 MG/G
OINTMENT TOPICAL 2 TIMES DAILY
Status: DISPENSED | OUTPATIENT
Start: 2022-12-29 | End: 2023-01-03

## 2022-12-29 RX ADMIN — KETOROLAC TROMETHAMINE 15 MG: 30 INJECTION, SOLUTION INTRAMUSCULAR at 09:12

## 2022-12-29 RX ADMIN — MORPHINE SULFATE 4 MG: 4 INJECTION INTRAVENOUS at 03:12

## 2022-12-29 RX ADMIN — REMDESIVIR 200 MG: 100 INJECTION, POWDER, LYOPHILIZED, FOR SOLUTION INTRAVENOUS at 01:12

## 2022-12-29 RX ADMIN — ENOXAPARIN SODIUM 80 MG: 80 INJECTION SUBCUTANEOUS at 09:12

## 2022-12-29 RX ADMIN — PANTOPRAZOLE SODIUM 40 MG: 40 INJECTION, POWDER, FOR SOLUTION INTRAVENOUS at 09:12

## 2022-12-29 RX ADMIN — REMDESIVIR 100 MG: 100 INJECTION, POWDER, LYOPHILIZED, FOR SOLUTION INTRAVENOUS at 11:12

## 2022-12-29 RX ADMIN — KETOROLAC TROMETHAMINE 15 MG: 30 INJECTION, SOLUTION INTRAMUSCULAR at 02:12

## 2022-12-29 RX ADMIN — MUPIROCIN: 20 OINTMENT TOPICAL at 09:12

## 2022-12-29 NOTE — CONSULTS
Cardiovascular Consultation    Patient Name: Asael Ramos  Age: 82 y.o.  : 1940  MRN: [id number]  Admission Date: 2022  Primary Cardiologist: Chante  ?  Chief Complaint:   Chief Complaint   Patient presents with    Weakness     Pt to ER via [facility] for weakness.  Also fever.  Started yesterday.  EMS gave 500 NS       History of Present Illness:  Asael Ramos is a 82 y.o. male with past medical history including persistent atrial fibrillation on Eliquis, CAD with prior PCI, hypertension, DOUG, skin cancer, and dementia.    Patient was admitted to [facility] on 2022 after noted fall at home. Patient had been feeling increasing weakness which led to fall. No reported syncope. No palpitations or chest discomfort reported. Patient's family noted a mild fever, COVID19 testing was positive.  Patient did suffer lumbar fracture as the result of the fall, orthopedics has been consulted for this. Eliquis is currently on holding, patient is being anticoagulated with Lovenox in the meantime.  Trop only mildly elevated and has since trended down - peak trop was 0.2 and now 0.17. Lab work unremarkable.    Patient is currently resting comfortably in the bed. Daughter is at the bedside. He is on face mask. Note overnight patient with excessive sedation after low dose of Morphine. No complaints at the time of interview.        Review of Systems:  Review of Systems - 12 point review of systems was performed and reviewed with the patient and was negative except as indicated in the History of Present Illness.    Health Status  Review of patient's allergies indicates:  No Known Allergies    Past Medical History:   Diagnosis Date    A-fib     Brain concussion     CAD (coronary artery disease)     Malignant melanoma of skin, unspecified     Mild cognitive impairment     Mixed hyperlipidemia     Pulmonary hypertension     Shingles     Sleep apnea, unspecified        Current Facility-Administered Medications   Medication Dose  Route Frequency Provider Last Rate Last Admin    enoxaparin injection 80 mg  80 mg Subcutaneous Q12H Froylan Sanchez MD   80 mg at 12/29/22 0913    hydrOXYzine HCL tablet 10 mg  10 mg Oral TID PRN ESSIE Blas        ketorolac injection 15 mg  15 mg Intravenous Q6H PRN Froylan Sanchez MD        naloxone (NARCAN) 0.4 mg/mL injection             ondansetron injection 4 mg  4 mg Intravenous Q6H PRN ESSIE Blas        pantoprazole injection 40 mg  40 mg Intravenous Daily Froylan Sanchez MD   40 mg at 12/29/22 0911    remdesivir 100 mg in sodium chloride 0.9% 100 mL infusion  100 mg Intravenous Daily ESSIE Blas           Family History   Problem Relation Age of Onset    Breast cancer Mother     Cancer Brother        Past Surgical History:   Procedure Laterality Date    CATARACT EXTRACTION      CORONARY STENT PLACEMENT      HIP REPLACEMENT ARTHROPLASTY      TOTAL SHOULDER ARTHROPLASTY Bilateral        Social History     Socioeconomic History    Marital status:    Tobacco Use    Smoking status: Never    Smokeless tobacco: Never   Substance and Sexual Activity    Alcohol use: Yes     Alcohol/week: 2.0 standard drinks     Types: 1 Glasses of wine, 1 Cans of beer per week       Physical Examination:  Vital signs:  Temp:  [97.3 °F (36.3 °C)-98.8 °F (37.1 °C)] 97.3 °F (36.3 °C)  Pulse:  [83-99] 83  Resp:  [17-22] 21  SpO2:  [91 %-96 %] 96 %  BP: (127-154)/() 148/80  Patient Vitals for the past 8 hrs:   BP Temp Temp src Pulse Resp SpO2   12/29/22 0808 (!) 148/80 97.3 °F (36.3 °C) Axillary 83 (!) 21 96 %   12/29/22 0353 -- -- -- -- 18 --        Recent Results (from the past 24 hour(s))   COVID-19 Rapid Screening    Collection Time: 12/28/22  1:03 PM   Result Value Ref Range    SARS COV-2 MOLECULAR Positive (A) Negative   CBC with Differential    Collection Time: 12/28/22  2:11 PM   Result Value Ref Range    WBC 5.8 4.5 - 11.5 x10(3)/mcL    RBC 4.07 (L) 4.70 - 6.10 x10(6)/mcL     Hgb 12.6 (L) 14.0 - 18.0 gm/dL    Hct 39.2 (L) 42.0 - 52.0 %    MCV 96.3 (H) 80.0 - 94.0 fL    MCH 31.0 pg    MCHC 32.1 (L) 33.0 - 36.0 mg/dL    RDW 15.5 (H) 11.6 - 14.4 %    Platelet 86 (L) 140 - 371 x10(3)/mcL    MPV 11.0 9.4 - 12.4 fL    Neut % 79.7 %    Lymph % 10.6 %    Mono % 9.2 %    Eos % 0.0 %    Basophil % 0.2 %    Lymph # 0.61 0.6 - 4.6 x10(3)/mcL    Neut # 4.60 2.1 - 9.2 x10(3)/mcL    Mono # 0.53 0.1 - 1.3 x10(3)/mcL    Eos # 0.00 0 - 0.9 x10(3)/mcL    Baso # 0.01 0 - 0.2 x10(3)/mcL    IG# 0.02 0 - 0.04 x10(3)/mcL    IG% 0.3 %    NRBC% 0.0 0 - 1 %   Comprehensive Metabolic Panel    Collection Time: 12/28/22  2:11 PM   Result Value Ref Range    Sodium Level 142 136 - 145 mmol/L    Potassium Level 4.2 3.5 - 5.1 mmol/L    Chloride 113 (H) 98 - 107 mmol/L    Carbon Dioxide 18 (L) 23 - 31 mmol/L    Glucose Level 110 82 - 115 mg/dL    Blood Urea Nitrogen 18.4 8.4 - 25.7 mg/dL    Creatinine 0.91 0.73 - 1.18 mg/dL    Calcium Level Total 9.0 8.8 - 10.0 mg/dL    Protein Total 6.2 5.8 - 7.6 gm/dL    Albumin Level 3.5 3.4 - 4.8 g/dL    Globulin 2.7 2.4 - 3.5 gm/dL    Albumin/Globulin Ratio 1.3 1.1 - 2.0 ratio    Bilirubin Total 1.2 <=1.5 mg/dL    Alkaline Phosphatase 106 40 - 150 unit/L    Alanine Aminotransferase 31 0 - 55 unit/L    Aspartate Aminotransferase 61 (H) 5 - 34 unit/L    eGFR >60 mls/min/1.73/m2   Magnesium    Collection Time: 12/28/22  2:11 PM   Result Value Ref Range    Magnesium Level 2.00 1.60 - 2.60 mg/dL   Lactic Acid, Plasma    Collection Time: 12/28/22  2:11 PM   Result Value Ref Range    Lactic Acid Level 2.1 0.5 - 2.2 mmol/L   Troponin I    Collection Time: 12/28/22  2:11 PM   Result Value Ref Range    Troponin-I 0.165 (H) 0.000 - 0.045 ng/mL   Protime-INR    Collection Time: 12/28/22  2:11 PM   Result Value Ref Range    PT 18.0 (H) 12.5 - 14.5 seconds    INR 1.51 (H) 0.00 - 1.30   APTT    Collection Time: 12/28/22  2:11 PM   Result Value Ref Range    PTT 31.8 23.2 - 33.7 seconds   Lactic  Acid, Plasma    Collection Time: 12/28/22  4:29 PM   Result Value Ref Range    Lactic Acid Level 1.9 0.5 - 2.2 mmol/L   Urinalysis, Reflex to Urine Culture Urine, Clean Catch    Collection Time: 12/28/22  4:47 PM    Specimen: Urine   Result Value Ref Range    Color, UA Dark Yellow Yellow, Light-Yellow, Dark Yellow, Kassie, Straw    Appearance, UA Clear Clear    Specific Gravity, UA 1.025 1.001 - 1.030    pH, UA 5.5 5.0 - 8.5    Protein, UA 1+ (A) Negative mg/dL    Glucose, UA Negative Negative, Normal mg/dL    Ketones, UA 1+ (A) Negative mg/dL    Blood, UA Trace (A) Negative unit/L    Bilirubin, UA Negative Negative mg/dL    Urobilinogen, UA 1.0 0.2, 1.0, Normal mg/dL    Nitrites, UA Negative Negative    Leukocyte Esterase, UA Negative Negative unit/L   Drug Screen, Urine    Collection Time: 12/28/22  4:47 PM   Result Value Ref Range    Amphetamines, Urine Negative Negative    Barbituates, Urine Negative Negative    Benzodiazepine, Urine Negative Negative    Cannabinoids, Urine Negative Negative    Cocaine, Urine Negative Negative    Fentanyl, Urine Negative Negative    MDMA, Urine Negative Negative    Opiates, Urine Negative Negative    Phencyclidine, Urine Negative Negative    pH, Urine 5.5 3.0 - 11.0    Specific Gravity, Urine Auto 1.025 1.001 - 1.035   Urinalysis, Microscopic    Collection Time: 12/28/22  4:47 PM   Result Value Ref Range    RBC, UA <5 <=5 /HPF    WBC, UA 5 <=5 /HPF    Squamous Epithelial Cells, UA <5 <=5 /HPF    Bacteria, UA None Seen None Seen, Rare, Occasional /HPF   Troponin I    Collection Time: 12/28/22  6:31 PM   Result Value Ref Range    Troponin-I 0.207 (H) 0.000 - 0.045 ng/mL   Troponin I    Collection Time: 12/29/22 12:07 AM   Result Value Ref Range    Troponin-I 0.179 (H) 0.000 - 0.045 ng/mL   Comprehensive metabolic panel    Collection Time: 12/29/22 12:07 AM   Result Value Ref Range    Sodium Level 140 136 - 145 mmol/L    Potassium Level 4.2 3.5 - 5.1 mmol/L    Chloride 112 (H) 98 -  107 mmol/L    Carbon Dioxide 16 (L) 23 - 31 mmol/L    Glucose Level 110 82 - 115 mg/dL    Blood Urea Nitrogen 20.4 8.4 - 25.7 mg/dL    Creatinine 0.87 0.73 - 1.18 mg/dL    Calcium Level Total 9.0 8.8 - 10.0 mg/dL    Protein Total 6.2 5.8 - 7.6 gm/dL    Albumin Level 3.5 3.4 - 4.8 g/dL    Globulin 2.7 2.4 - 3.5 gm/dL    Albumin/Globulin Ratio 1.3 1.1 - 2.0 ratio    Bilirubin Total 1.1 <=1.5 mg/dL    Alkaline Phosphatase 101 40 - 150 unit/L    Alanine Aminotransferase 42 0 - 55 unit/L    Aspartate Aminotransferase 95 (H) 5 - 34 unit/L    eGFR >60 mls/min/1.73/m2   CBC with Differential    Collection Time: 12/29/22 12:22 AM   Result Value Ref Range    WBC 6.4 4.5 - 11.5 x10(3)/mcL    RBC 4.24 (L) 4.70 - 6.10 x10(6)/mcL    Hgb 13.2 (L) 14.0 - 18.0 gm/dL    Hct 40.5 (L) 42.0 - 52.0 %    MCV 95.5 (H) 80.0 - 94.0 fL    MCH 31.1 pg    MCHC 32.6 (L) 33.0 - 36.0 mg/dL    RDW 15.5 (H) 11.6 - 14.4 %    Platelet 83 (L) 140 - 371 x10(3)/mcL    MPV 10.6 9.4 - 12.4 fL    Neut % 71.5 %    Lymph % 15.0 %    Mono % 13.0 %    Eos % 0.0 %    Basophil % 0.2 %    Lymph # 0.96 0.6 - 4.6 x10(3)/mcL    Neut # 4.58 2.1 - 9.2 x10(3)/mcL    Mono # 0.83 0.1 - 1.3 x10(3)/mcL    Eos # 0.00 0 - 0.9 x10(3)/mcL    Baso # 0.01 0 - 0.2 x10(3)/mcL    IG# 0.02 0 - 0.04 x10(3)/mcL    IG% 0.3 %    NRBC% 0.0 0 - 1 %   Troponin I    Collection Time: 12/29/22  7:55 AM   Result Value Ref Range    Troponin-I 0.164 (H) 0.000 - 0.045 ng/mL     [unfilled]  Wt Readings from Last 3 Encounters:   12/28/22 79.4 kg (175 lb)   10/19/22 85.7 kg (189 lb)   01/18/22 86.5 kg (190 lb 11.2 oz)         Physical Exam   Constitutional: resting comfortably. No distress  Eyes: Conjunctivae and EOM are normal. Pupils are equal, round, and reactive to light.   Neck: Normal range of motion. Neck supple.   Cardiovascular: Normal rate, irregular rhythm and normal heart sounds. In Afib on telemetry monitoring.   Pulmonary/Chest: Effort normal and breath sounds normal.   Abdominal:  Soft. Bowel sounds are normal. There is no tenderness.   Musculoskeletal: Normal range of motion.   Neurological: Alert and oriented to person, place, and time. Gait normal.   Skin: Skin is warm and dry.   Psychiatric: Affect normal.       Assessment/Plan:    NSTEMI, known CAD  -likely Type II MI r/t demand ischemia with fall and COVID19 infection  -peak troponin 0.2 -> trended down to 0.17  -continue medical therapy   -consider starting aspirin if no invasive intervention to be done re: lumbar fracture  -echo now  -consider additional workup in the outpatient setting with stress test as patient condition improves    2. Permanent Afib  -continue Lovenox 1 mg/kg BID for now  -rate controlled at present  -continue close telemetry monitoring    3. COVID19 infection  -per primary  -patient on remdesivir    4. HTN  -monitor BP closely  -adjust medical therapy as indicated    5. Lumbar fracture  -orthopedics has been consulted        *Patient of Dr. Sharif.          KARRI Lyman, FNP-C  [facility]

## 2022-12-29 NOTE — NURSING
Consult called for Cardiology and Neurosurgery  Paged Cardiology for elevated trop & pt on eliquis for afib but had not received since coming to hospital

## 2022-12-29 NOTE — NURSING
Paged Dr. Montoya for new pain meds order, patient became lethargic after last dose, narcan administered and issues resolved.

## 2022-12-29 NOTE — CONSULTS
[facility] - [facility] Tele[facility]  Neurosurgery  Consult Note    Inpatient consult to Neurosurgery  Consult performed by: TREVON Sanchez  Consult ordered by: ESSIE Blas  Reason for consult: L1 VCF      Subjective:     Chief Complaint/Reason for Admission: Back pain s/p fall    History of Present Illness: Patient is an 82-year-old man with PMHx significant for a-fib (on eliquis), CAD s/p stents, pulmonary HTN, COPD, hyperlipidemia, dementia and h/o malignant melanoma, who was well until 3-4 days ago when he developed a low-grade fever.  He became somewhat weaker than normal and fell on his way to the restroom about 2 days ago.  Following that he had increasingly severe low back pain.  He was brought to the emergency room where he was assessed and found to have an L1 compression fracture as well as a COVID respiratory tract infection.  He was not coughing much at home but began to cough more overnight.  His cough seemed to be exacerbated by pain meds.  He was receiving IV morphine in his breathing seemed to worsen and he began coughing more and became very somnolent and more confused than baseline.  He was given a dose of Narcan in his mental status improved.  He only had received 1 mg of morphine IV prior to the change in mental status.  Since that treatment he has however improved and seems to be close to baseline he is attended by his daughter.    Dr. Pettit has been consulted for evaluation and treatment recommendations regarding his lumbar findings.    On PE today he is lying in bed, NAD. He reports his back pain has improved since admit. He denies LE complaints. He has not urinated since admission per daughter but he has been NPO. He denies saddle anesthesia.    PTA Medications   Medication Sig    apixaban (ELIQUIS) 5 mg Tab Take 5 mg by mouth 2 (two) times a day.    cholecalciferol, vitamin D3, (VITAMIN D3) 25 mcg (1,000 unit) capsule Take 1,000 Units by mouth Daily.     hydrOXYzine HCL (ATARAX) 10 MG Tab Take 1 tablet (10 mg total) by mouth 3 (three) times daily as needed (itching).    magnesium oxide 500 mg Tab Take 1 tablet by mouth once daily.    multivitamin (THERAGRAN) per tablet Take 1 tablet by mouth once daily.    omega-3 fatty acids/fish oil (FISH OIL-OMEGA-3 FATTY ACIDS) 300-1,000 mg capsule Take 1 g by mouth Daily.       Review of patient's allergies indicates:  No Known Allergies    Past Medical History:   Diagnosis Date    A-fib     Brain concussion     CAD (coronary artery disease)     Malignant melanoma of skin, unspecified     Mild cognitive impairment     Mixed hyperlipidemia     Pulmonary hypertension     Shingles     Sleep apnea, unspecified      Past Surgical History:   Procedure Laterality Date    CATARACT EXTRACTION      CORONARY STENT PLACEMENT      HIP REPLACEMENT ARTHROPLASTY      TOTAL SHOULDER ARTHROPLASTY Bilateral      Family History       Problem Relation (Age of Onset)    Breast cancer Mother    Cancer Brother          Tobacco Use    Smoking status: Never    Smokeless tobacco: Never   Substance and Sexual Activity    Alcohol use: Yes     Alcohol/week: 2.0 standard drinks     Types: 1 Glasses of wine, 1 Cans of beer per week    Drug use: Not on file    Sexual activity: Not on file     Review of Systems  Objective:   12 pt ROS WNL, except for HPI    Weight: 79.4 kg (175 lb)  Body mass index is 23.09 kg/m².  Vital Signs (Most Recent):  Temp: 97.3 °F (36.3 °C) (12/29/22 0808)  Pulse: 83 (12/29/22 0808)  Resp: (!) 21 (12/29/22 0808)  BP: (!) 148/80 (12/29/22 0808)  SpO2: 96 % (12/29/22 0808)   Vital Signs (24h Range):  Temp:  [97.3 °F (36.3 °C)-98.8 °F (37.1 °C)] 97.3 °F (36.3 °C)  Pulse:  [83-99] 83  Resp:  [17-22] 21  SpO2:  [91 %-96 %] 96 %  BP: (127-154)/() 148/80       Physical Exam:    Constitutional: He appears well-developed and well-nourished. No distress.     Eyes: Pupils are equal, round, and reactive to light. EOM are normal.      Cardiovascular: Intact distal pulses.     Abdominal: Soft. Bowel sounds are normal.     Psych/Behavior:   Alert, oriented to self and year. Confused on situation.     Musculoskeletal:        Neck: Range of motion is full.        Back: Range of motion is limited. ROM severity is Limited with flexion d/t pain. There is tenderness.        Right Upper Extremities: Range of motion is full. Muscle strength is 5/5.        Left Upper Extremities: Range of motion is full. Muscle strength is 5/5.       Right Lower Extremities: Range of motion is full. Muscle strength is 5/5.        Left Lower Extremities: Range of motion is full. Muscle strength is 5/5.     Neurological:        Cranial nerves: Cranial nerve(s) II, III, IV, V, VI, VII, VIII, IX, X, XI and XII are intact.     Significant Labs:  Recent Labs   Lab 12/28/22  1411 12/29/22  0007    140   K 4.2 4.2   CO2 18* 16*   BUN 18.4 20.4   CREATININE 0.91 0.87   CALCIUM 9.0 9.0   MG 2.00  --      Recent Labs   Lab 12/28/22  1411 12/29/22  0022   WBC 5.8 6.4   HGB 12.6* 13.2*   HCT 39.2* 40.5*   PLT 86* 83*     Recent Labs   Lab 12/28/22  1411   INR 1.51*     Microbiology Results (last 7 days)       ** No results found for the last 168 hours. **            Significant Diagnostics:  CT Lumbar Spine Without Contrast [[id number]] Resulted: 12/28/22 1541   Order Status: Completed Updated: 12/28/22 1543   Narrative:     EXAMINATION:   CT LUMBAR SPINE WITHOUT CONTRAST     CLINICAL HISTORY:   Low back pain, trauma;     TECHNIQUE:   Noncontrast CT images of the lumbar spine. Axial, coronal, and sagittal reformatted images were obtained. Dose length product is 1014 mGycm. Automatic exposure control, adjustment of mA/kV or iterative reconstruction technique was used to limit radiation dose.     COMPARISON:   None     FINDINGS:   There are 5 non-rib-bearing lumbar type vertebral bodies.  There is a leftward scoliotic curvature of the lumbar spine with grade 1 retrolisthesis of  L1, L2 and L3.  There is a minimally displaced fracture through the anterior inferior endplate on the right at L1 (coronal image 36).  There is a thin lucency through the right C3-C4 marginal osteophytes (series 6, image 74).  There are multilevel degenerative changes with disc height loss, marginal osteophyte formation and facet arthropathy.  Bilateral pleural effusions are noted.    Impression:       1. Minimally displaced fracture of the L1 anterior inferior endplate on the right.   2. Nondisplaced fracture through the right L3-L4 marginal osteophytes.        Assessment/Plan:     His back pain is currently controlled. He is motor intact.  CT reviewed; fx minimally displaced without retropulsion  We will treat conservatively in a LSO brace  OK to ambulate with PT/OT  Pain control per primary  He can f/u outpt with Dr. Pettit in 3-4 weeks    Thank you for your consult. I will sign off. Please contact us if you have any additional questions.    TREVON Sanchez  Neurosurgery  [facility] - [facility]

## 2022-12-29 NOTE — PT/OT/SLP PROGRESS
Physical Therapy      Patient Name:  Asael Ramos   MRN:  [id number]    Patient not seen today secondary to Patient fatigue and pain, patient's family requesting PT return tomorrow morning. Will follow up as schedule permits.

## 2022-12-29 NOTE — ED PROVIDER NOTES
Encounter Date: 12/28/2022       History     Chief Complaint   Patient presents with    Weakness     Pt to ER via [facility] for weakness.  Also fever.  Started yesterday.  EMS gave 500 NS     82-year-old male presents from home with his daughter at bedside for a fall that happened this morning around 1:00 a.m. as he was trying to get out of the bed landed face 1st and was on the ground for approximately 2 hours will for his grandson and son-in-law were able to get him off of the ground.  It does not appear that he lost consciousness.  He is on Eliquis for a history of atrial fibrillation.  He denies pain however noticeable grimacing with certain movements in the bed.  Daughter states that he lives at home with his wife and he normally ambulates without assistance slowly.  Daughter also states that the patient has been running a fever that was noticed yesterday and a little bit weaker than normal.    The history is provided by a relative and the patient. No  was used.   Fall  The accident occurred several hours ago. Fall occurred: Trying to get out of bed. He fell from a height of 3 to 5 ft. He landed on A hard floor. There was no blood loss. The point of impact was the face and head. The pain is present in the back. The pain is at a severity of 5/10. He was Ambulatory at the scene. There was No entrapment after the fall. There was No drug use involved in the accident. There was No alcohol use involved in the accident. Associated symptoms include back pain and a fever. The symptoms are aggravated by activity. He has tried nothing for the symptoms.   Review of patient's allergies indicates:  No Known Allergies  Past Medical History:   Diagnosis Date    A-fib     Brain concussion     CAD (coronary artery disease)     Malignant melanoma of skin, unspecified     Mild cognitive impairment     Mixed hyperlipidemia     Pulmonary hypertension     Shingles     Sleep apnea, unspecified      Past Surgical  History:   Procedure Laterality Date    CATARACT EXTRACTION      CORONARY STENT PLACEMENT      HIP REPLACEMENT ARTHROPLASTY      TOTAL SHOULDER ARTHROPLASTY Bilateral      Family History   Problem Relation Age of Onset    Breast cancer Mother     Cancer Brother      Social History     Tobacco Use    Smoking status: Never    Smokeless tobacco: Never   Substance Use Topics    Alcohol use: Yes     Alcohol/week: 2.0 standard drinks     Types: 1 Glasses of wine, 1 Cans of beer per week     Review of Systems   Constitutional:  Positive for fever.   Musculoskeletal:  Positive for back pain.   Neurological:  Positive for weakness.   All other systems reviewed and are negative.    Physical Exam     Initial Vitals [12/28/22 1235]   BP Pulse Resp Temp SpO2   137/75 94 (!) 22 98.8 °F (37.1 °C) 95 %      MAP       --         Physical Exam    Nursing note and vitals reviewed.  Constitutional: He appears well-developed.   HENT:   Mouth/Throat: Mucous membranes are dry.   Eyes: Conjunctivae are normal.   Cardiovascular:  Normal rate, regular rhythm and normal heart sounds.           Pulmonary/Chest: Breath sounds normal. No respiratory distress.   Musculoskeletal:      Cervical back: No tenderness. Normal range of motion.      Thoracic back: No tenderness or bony tenderness.      Lumbar back: Tenderness present. No bony tenderness.      Comments: Moves bilateral legs and lifts them off the bed and pushes against me. Unstable to stand. Also when trying to get him to put legs back in bed he didn't quite understand the commands.      Neurological: He is alert. GCS eye subscore is 4. GCS verbal subscore is 4. GCS motor subscore is 5.   Hx dementia - mild confusion. Answers some questions accurately. Daughter at bedside states he is a bit more confused than normal.        Skin: Skin is warm and dry.   Psychiatric: He has a normal mood and affect.       ED Course   Procedures  Labs Reviewed   CBC WITH DIFFERENTIAL - Abnormal; Notable for  the following components:       Result Value    RBC 4.07 (*)     Hgb 12.6 (*)     Hct 39.2 (*)     MCV 96.3 (*)     MCHC 32.1 (*)     RDW 15.5 (*)     Platelet 86 (*)     All other components within normal limits   COMPREHENSIVE METABOLIC PANEL - Abnormal; Notable for the following components:    Chloride 113 (*)     Carbon Dioxide 18 (*)     Aspartate Aminotransferase 61 (*)     All other components within normal limits   URINALYSIS, REFLEX TO URINE CULTURE - Abnormal; Notable for the following components:    Protein, UA 1+ (*)     Ketones, UA 1+ (*)     Blood, UA Trace (*)     All other components within normal limits   TROPONIN I - Abnormal; Notable for the following components:    Troponin-I 0.165 (*)     All other components within normal limits   PROTIME-INR - Abnormal; Notable for the following components:    PT 18.0 (*)     INR 1.51 (*)     All other components within normal limits   SARS-COV-2 RNA AMPLIFICATION, QUAL - Abnormal; Notable for the following components:    SARS COV-2 MOLECULAR Positive (*)     All other components within normal limits    Narrative:     The IDNOW COVID-19 assay is a rapid molecular in vitro diagnostic test utilizing an isothermal nucleic acid amplification technology intended for the qualitative detection of nucleic acid from the SARS-CoV-2 viral RNA in direct nasal, nasopharyngeal or throat swabs from individuals who are suspected of COVID-19 by their healthcare provider.   TROPONIN I - Abnormal; Notable for the following components:    Troponin-I 0.207 (*)     All other components within normal limits   MAGNESIUM - Normal   LACTIC ACID, PLASMA - Normal   APTT - Normal   DRUG SCREEN, URINE (BEAKER) - Normal    Narrative:     Cut off concentrations:    Amphetamines - 1000 ng/ml  Barbiturates - 200 ng/ml  Benzodiazepine - 200 ng/ml  Cannabinoids (THC) - 50 ng/ml  Cocaine - 300 ng/ml  Fentanyl - 1.0 ng/ml  MDMA - 500 ng/ml  Opiates - 300 ng/ml   Phencyclidine (PCP) - 25  ng/ml    Specimen submitted for drug analysis and tested for pH and specific gravity in order to evaluate sample integrity. Suspect tampering if specific gravity is <1.003 and/or pH is not within the range of 4.5 - 8.0  False negatives may result form substances such as bleach added to urine.  False positives may result for the presence of a substance with similar chemical structure to the drug or its metabolite.    This test provides only a PRELIMINARY analytical test result. A more specific alternate chemical method must be used in order to obtain a confirmed analytical result. Gas chromatography/mass spectrometry (GC/MS) is the preferred confirmatory method. Other chemical confirmation methods are available. Clinical consideration and professional judgement should be applied to any drug of abuse test result, particularly when preliminary positive results are used.    Positive results will be confirmed only at the physicians request. Unconfirmed screening results are to be used only for medical purposes (treatment).        LACTIC ACID, PLASMA - Normal   URINALYSIS, MICROSCOPIC - Normal     EKG Readings: (Independently Interpreted)   Initial Reading: No STEMI. Rhythm: Atrial Fibrillation. Heart Rate: 89. Ectopy: No Ectopy. ST Segments: Normal ST Segments. Clinical Impression: Atrial Fibrillation     Imaging Results              XR Ribs Min 4 Views w/PA Chest Bilat (Final result)  Result time 12/28/22 18:22:29   Procedure changed from X-Ray Ribs 3 Views Bilateral     Final result by Mireya Servin MD (12/28/22 18:22:29)                   Impression:      No acute abnormality identified.      Electronically signed by: Mireya Servin  Date:    12/28/2022  Time:    18:22               Narrative:    EXAMINATION:  XR RIBS MIN 4 VIEWS W/ PA CHEST BILAT    CLINICAL HISTORY:  Unspecified fall, initial encounter fall;    COMPARISON:  Chest x-ray dated 12/28/2022    FINDINGS:  The heart is stable in size.  There is no  focal airspace consolidation.  There is no definite pleural effusion or visible pneumothorax.  There is no displaced rib fracture identified.                                       CT Lumbar Spine Without Contrast (Final result)  Result time 12/28/22 15:41:01      Final result by Mireya Servin MD (12/28/22 15:41:01)                   Impression:      1. Minimally displaced fracture of the L1 anterior inferior endplate on the right.  2. Nondisplaced fracture through the right L3-L4 marginal osteophytes.      Electronically signed by: Mireya Servin  Date:    12/28/2022  Time:    15:41               Narrative:    EXAMINATION:  CT LUMBAR SPINE WITHOUT CONTRAST    CLINICAL HISTORY:  Low back pain, trauma;    TECHNIQUE:  Noncontrast CT images of the lumbar spine. Axial, coronal, and sagittal reformatted images were obtained. Dose length product is 1014 mGycm. Automatic exposure control, adjustment of mA/kV or iterative reconstruction technique was used to limit radiation dose.    COMPARISON:  None    FINDINGS:  There are 5 non-rib-bearing lumbar type vertebral bodies.  There is a leftward scoliotic curvature of the lumbar spine with grade 1 retrolisthesis of L1, L2 and L3.  There is a minimally displaced fracture through the anterior inferior endplate on the right at L1 (coronal image 36).  There is a thin lucency through the right C3-C4 marginal osteophytes (series 6, image 74).  There are multilevel degenerative changes with disc height loss, marginal osteophyte formation and facet arthropathy.  Bilateral pleural effusions are noted.                                       CT Pelvis Without Contrast (Final result)  Result time 12/28/22 15:48:28      Final result by Logan Pappas MD (12/28/22 15:48:28)                   Impression:      No acute traumatic pelvic injury identified.      Electronically signed by: Logan Pappas  Date:    12/28/2022  Time:    15:48               Narrative:    EXAMINATION:  CT PELVIS  WITHOUT CONTRAST    CLINICAL HISTORY:  Pelvic trauma;    TECHNIQUE:  CT imaging of the pelvis without IV contrast.  Axial, coronal and sagittal images reviewed.  Dose length product 156 mGycm. Automatic exposure control utilized to limit radiation dose.    COMPARISON:  No relevant comparison studies available at the time of dictation.    FINDINGS:  Decreased overall bone mineralization.  Left hip arthroplasty results in streak artifact through portions of the mid to lower pelvis.  No acute fracture identified.  Hips are aligned with advanced degenerative changes at the right hip.  Numerous arterial calcifications.                                       CT Cervical Spine Without Contrast (Final result)  Result time 12/28/22 13:45:55      Final result by Mrieya Servin MD (12/28/22 13:45:55)                   Impression:      Evaluation limited by motion artifact.  No acute fracture identified.      Electronically signed by: Mireya Servin  Date:    12/28/2022  Time:    13:45               Narrative:    EXAMINATION:  CT CERVICAL SPINE WITHOUT CONTRAST    CLINICAL HISTORY:  Neck trauma (Age >= 65y);    TECHNIQUE:  Noncontrast CT images of the cervical spine. Axial, coronal, and sagittal reformatted images were obtained. Dose length product is 691 mGycm. Automatic exposure control, adjustment of mA/kV or iterative reconstruction technique was used to limit radiation dose.    COMPARISON:  None    FINDINGS:  The cervical spine is visualized to the level of C7-T1.    Evaluation is limited by motion artifact.  There is no definite acute fracture identified.  There is minimal retrolisthesis of C3 and C4.  There are multilevel degenerative changes with disc height loss, marginal osteophyte formation and facet arthropathy.  There is no paraspinal hematoma.                                       CT Head Without Contrast (Final result)  Result time 12/28/22 13:39:00      Final result by Rui Appiah MD (12/28/22  13:39:00)                   Impression:      No acute intracranial abnormality identified.  Findings of chronic microvascular ischemic disease.      Electronically signed by: Rui Appiah  Date:    12/28/2022  Time:    13:39               Narrative:    EXAMINATION:  CT HEAD WITHOUT CONTRAST    CLINICAL HISTORY:  Transient ischemic attack (TIA);Head trauma, minor (Age >= 65y);    TECHNIQUE:  Low dose axial images were obtained through the head.  Coronal and sagittal reformations were also performed. Contrast was not administered.    Automatic exposure control was utilized to reduce the patient's radiation dose.    DLP= 2372    COMPARISON:  MR dated 01/25/2019    FINDINGS:  No acute intracranial hemorrhage, edema or mass. No acute parenchymal abnormality.    Scattered hypodensities throughout the deep periventricular white matter.    Diffuse cerebral atrophy with concordant ventricular enlargement    The osseous structures are normal.    The mastoid air cells are clear.    Debris within the left auditory canal.    The globes and orbital contents are normal bilaterally.    The visualized maxillary, ethmoid and sphenoid sinuses are clear.                                       X-Ray Chest 1 View (Final result)  Result time 12/28/22 13:34:56      Final result by Rui Appiah MD (12/28/22 13:34:56)                   Impression:      No acute cardiopulmonary process.      Electronically signed by: Rui Appiah  Date:    12/28/2022  Time:    13:34               Narrative:    EXAMINATION:  XR CHEST 1 VIEW    CLINICAL HISTORY:  fever;    TECHNIQUE:  Single view of the chest    COMPARISON:  No prior imaging available for comparison.    FINDINGS:  No focal opacification, pleural effusion, or pneumothorax.    The cardiomediastinal silhouette is within normal limits.    No acute osseous abnormality.                                       Medications   HYDROcodone-acetaminophen 5-325 mg per tablet 1 tablet (0 tablets Oral  Hold 12/28/22 1630)   0.9%  NaCl infusion (0 mLs Intravenous Stopped 12/28/22 1642)   0.9%  NaCl infusion (500 mLs Intravenous New Bag 12/28/22 1630)   morphine injection 2 mg (2 mg Intravenous Given 12/28/22 1645)   0.9%  NaCl infusion (1,000 mLs Intravenous New Bag 12/28/22 1745)   morphine injection 4 mg (4 mg Intravenous Given 12/28/22 1922)     Medical Decision Making:   Clinical Tests:   Lab Tests: Ordered and Reviewed  Radiological Study: Ordered and Reviewed  Medical Tests: Ordered and Reviewed  ED Management:  82-year-old male presents with daughter at bedside for fever and weakness which started yesterday had a fall this morning around 1:00 a.m. was on the ground for approximately 2 hours while family was trying to get him up.  He is positive COVID oxygen stable until morphine given then slightly hypoxic 2 L oxygen nasal cannula applied.  Troponin mildly elevated at 0.167 he denies any chest pain or shortness of breath.  He does see Cardiology Dr. Sharif for his history of atrial fibrillation which he is on Eliquis for.  His CT imaging is negative for his head and neck however does show an L1 anterior inferior endplate fracture.  He moves his legs well he is more unstable on his feet that he previously was per the daughter.  LSO brace applied.  Hydrated will admit for slow hydration, remdesivir, cardiology consult along with neurosurgery consult.  Other:   I have discussed this case with another health care provider.       <> Summary of the Discussion: Spoke with Dr. Sanchez who is on-call for Dr. Pena regarding patient's weakness fever COVID positive lumbar fracture.  Agrees with admission would like to start remdesivir.  Discussed elevated troponin and will consult his cardiologist along with Neurosurgery for their opinion as well.  LSO brace is on.      Spoke with Dr. Preston who also had face-to-face contact with patient  Additional MDM:   Differential Diagnosis:   Other: The following diagnoses  were also considered and will be evaluated: covid, dehydration and uti.                       Clinical Impression:   Final diagnoses:  [R53.1] Weakness  [W19.XXXA] Fall  [U07.1] COVID-19 (Primary)  [R77.8] Elevated troponin  [S32.018A] Other closed fracture of first lumbar vertebra, initial encounter  [S09.90XA] Injury of head, initial encounter        ED Disposition Condition    Admit Stable                ESSIE Blas  12/28/22 [id number]

## 2022-12-29 NOTE — PT/OT/SLP PROGRESS
Orders received, chart reviewed, and RN consulted. Pt lethargic after receiving pain medication. Multiple max stimulation provided with pt unable to sustain an appropriate ANÍBAL. PO intake not considered safe at this time. Family present at his bedside. Discussed POC and encouraged oral care when awake. SLP to follow and evaluate when appropriate.

## 2022-12-30 LAB
ALBUMIN SERPL-MCNC: 3.2 G/DL (ref 3.4–4.8)
ALBUMIN/GLOB SERPL: 1.2 RATIO (ref 1.1–2)
ALP SERPL-CCNC: 89 UNIT/L (ref 40–150)
ALT SERPL-CCNC: 55 UNIT/L (ref 0–55)
AST SERPL-CCNC: 113 UNIT/L (ref 5–34)
BASOPHILS # BLD AUTO: 0.01 X10(3)/MCL (ref 0–0.2)
BASOPHILS NFR BLD AUTO: 0.1 %
BILIRUBIN DIRECT+TOT PNL SERPL-MCNC: 1.1 MG/DL
BUN SERPL-MCNC: 29.9 MG/DL (ref 8.4–25.7)
CALCIUM SERPL-MCNC: 9.1 MG/DL (ref 8.8–10)
CHLORIDE SERPL-SCNC: 113 MMOL/L (ref 98–107)
CO2 SERPL-SCNC: 22 MMOL/L (ref 23–31)
CREAT SERPL-MCNC: 0.92 MG/DL (ref 0.73–1.18)
EOSINOPHIL # BLD AUTO: 0 X10(3)/MCL (ref 0–0.9)
EOSINOPHIL NFR BLD AUTO: 0 %
ERYTHROCYTE [DISTWIDTH] IN BLOOD BY AUTOMATED COUNT: 15.3 % (ref 11.6–14.4)
GFR SERPLBLD CREATININE-BSD FMLA CKD-EPI: >60 MLS/MIN/1.73/M2
GLOBULIN SER-MCNC: 2.7 GM/DL (ref 2.4–3.5)
GLUCOSE SERPL-MCNC: 101 MG/DL (ref 82–115)
HCT VFR BLD AUTO: 40.6 % (ref 42–52)
HGB BLD-MCNC: 13.6 GM/DL (ref 14–18)
IMM GRANULOCYTES # BLD AUTO: 0.03 X10(3)/MCL (ref 0–0.04)
IMM GRANULOCYTES NFR BLD AUTO: 0.3 %
LYMPHOCYTES # BLD AUTO: 0.9 X10(3)/MCL (ref 0.6–4.6)
LYMPHOCYTES NFR BLD AUTO: 10.4 %
MCH RBC QN AUTO: 31.4 PG
MCHC RBC AUTO-ENTMCNC: 33.5 MG/DL (ref 33–36)
MCV RBC AUTO: 93.8 FL (ref 80–94)
MONOCYTES # BLD AUTO: 0.64 X10(3)/MCL (ref 0.1–1.3)
MONOCYTES NFR BLD AUTO: 7.4 %
NEUTROPHILS # BLD AUTO: 7.09 X10(3)/MCL (ref 2.1–9.2)
NEUTROPHILS NFR BLD AUTO: 81.8 %
NRBC BLD AUTO-RTO: 0 % (ref 0–1)
PLATELET # BLD AUTO: 86 X10(3)/MCL (ref 140–371)
PMV BLD AUTO: 10.9 FL (ref 9.4–12.4)
POCT GLUCOSE: 97 MG/DL (ref 70–110)
POTASSIUM SERPL-SCNC: 4.1 MMOL/L (ref 3.5–5.1)
PROT SERPL-MCNC: 5.9 GM/DL (ref 5.8–7.6)
RBC # BLD AUTO: 4.33 X10(6)/MCL (ref 4.7–6.1)
SODIUM SERPL-SCNC: 142 MMOL/L (ref 136–145)
TROPONIN I SERPL-MCNC: 0.21 NG/ML (ref 0–0.04)
WBC # SPEC AUTO: 8.7 X10(3)/MCL (ref 4.5–11.5)

## 2022-12-30 PROCEDURE — 63600175 PHARM REV CODE 636 W HCPCS: Performed by: INTERNAL MEDICINE

## 2022-12-30 PROCEDURE — S5010 5% DEXTROSE AND 0.45% SALINE: HCPCS | Performed by: STUDENT IN AN ORGANIZED HEALTH CARE EDUCATION/TRAINING PROGRAM

## 2022-12-30 PROCEDURE — 92610 EVALUATE SWALLOWING FUNCTION: CPT

## 2022-12-30 PROCEDURE — 27000207 HC ISOLATION

## 2022-12-30 PROCEDURE — 97535 SELF CARE MNGMENT TRAINING: CPT

## 2022-12-30 PROCEDURE — 80053 COMPREHEN METABOLIC PANEL: CPT | Performed by: INTERNAL MEDICINE

## 2022-12-30 PROCEDURE — C9113 INJ PANTOPRAZOLE SODIUM, VIA: HCPCS | Performed by: INTERNAL MEDICINE

## 2022-12-30 PROCEDURE — 25000003 PHARM REV CODE 250: Performed by: INTERNAL MEDICINE

## 2022-12-30 PROCEDURE — 85025 COMPLETE CBC W/AUTO DIFF WBC: CPT | Performed by: INTERNAL MEDICINE

## 2022-12-30 PROCEDURE — 21400001 HC TELEMETRY ROOM

## 2022-12-30 PROCEDURE — 25000003 PHARM REV CODE 250: Performed by: STUDENT IN AN ORGANIZED HEALTH CARE EDUCATION/TRAINING PROGRAM

## 2022-12-30 PROCEDURE — 36415 COLL VENOUS BLD VENIPUNCTURE: CPT | Performed by: INTERNAL MEDICINE

## 2022-12-30 PROCEDURE — 99233 PR SUBSEQUENT HOSPITAL CARE,LEVL III: ICD-10-PCS | Mod: ,,, | Performed by: INTERNAL MEDICINE

## 2022-12-30 PROCEDURE — 97162 PT EVAL MOD COMPLEX 30 MIN: CPT

## 2022-12-30 PROCEDURE — 63600175 PHARM REV CODE 636 W HCPCS: Mod: TB | Performed by: NURSE PRACTITIONER

## 2022-12-30 PROCEDURE — 84484 ASSAY OF TROPONIN QUANT: CPT | Performed by: INTERNAL MEDICINE

## 2022-12-30 PROCEDURE — 25000003 PHARM REV CODE 250: Performed by: NURSE PRACTITIONER

## 2022-12-30 PROCEDURE — 99233 SBSQ HOSP IP/OBS HIGH 50: CPT | Mod: ,,, | Performed by: INTERNAL MEDICINE

## 2022-12-30 RX ORDER — POLYETHYLENE GLYCOL 3350 17 G/17G
17 POWDER, FOR SOLUTION ORAL DAILY
Status: DISCONTINUED | OUTPATIENT
Start: 2022-12-30 | End: 2023-01-05 | Stop reason: HOSPADM

## 2022-12-30 RX ORDER — BISACODYL 10 MG
10 SUPPOSITORY, RECTAL RECTAL ONCE
Status: COMPLETED | OUTPATIENT
Start: 2022-12-30 | End: 2022-12-30

## 2022-12-30 RX ORDER — DEXTROSE MONOHYDRATE AND SODIUM CHLORIDE 5; .45 G/100ML; G/100ML
INJECTION, SOLUTION INTRAVENOUS CONTINUOUS
Status: DISCONTINUED | OUTPATIENT
Start: 2022-12-30 | End: 2023-01-05 | Stop reason: HOSPADM

## 2022-12-30 RX ORDER — POLYETHYLENE GLYCOL 3350 17 G/17G
17 POWDER, FOR SOLUTION ORAL NIGHTLY
Status: DISCONTINUED | OUTPATIENT
Start: 2022-12-30 | End: 2023-01-05 | Stop reason: HOSPADM

## 2022-12-30 RX ADMIN — PANTOPRAZOLE SODIUM 40 MG: 40 INJECTION, POWDER, FOR SOLUTION INTRAVENOUS at 09:12

## 2022-12-30 RX ADMIN — BISACODYL 10 MG: 10 SUPPOSITORY RECTAL at 11:12

## 2022-12-30 RX ADMIN — REMDESIVIR 100 MG: 100 INJECTION, POWDER, LYOPHILIZED, FOR SOLUTION INTRAVENOUS at 10:12

## 2022-12-30 RX ADMIN — ENOXAPARIN SODIUM 80 MG: 80 INJECTION SUBCUTANEOUS at 11:12

## 2022-12-30 RX ADMIN — DEXTROSE AND SODIUM CHLORIDE: 5; 450 INJECTION, SOLUTION INTRAVENOUS at 02:12

## 2022-12-30 RX ADMIN — ENOXAPARIN SODIUM 80 MG: 80 INJECTION SUBCUTANEOUS at 10:12

## 2022-12-30 NOTE — PROGRESS NOTES
Subjective:  The patient is somnolent this morning.  The daughter says he has been like that for the past 4 hours or so.  He is not able to give much history although as I was about to leave the room I asked him again how he was doing began to wake up and thought he was doing okay.  The daughter stated that he had been very alert most of yesterday afternoon and last night but was somnolent when speech therapy came by to assess his swallowing.  He has been NPO since then.    Objective:  He is afebrile.  O2 sat 94%.  Blood pressure 152/89.  Heart rate 90.      General appearance is notable for frail elderly man who is poorly responsive but late during the exam and interview he woke up and was much more alert.  Not back to baseline however.  Heart has a regular rate and rhythm.  Lungs clear.  Abdomen had some mild generalized tenderness.  Nondistended.  Normal bowel sounds.  (Daughter thinks he is constipated) extremities without clubbing cyanosis or edema.    Lab work today includes a normal CBC and improved chemistry profile.  Bicarb is up to 22.  BUN is a bit high at 30 creatinine 0.92 troponin still elevated at 0.212.    Assessment: 1.  L1 compression fracture.  Neurosurgery recommends conservative treatment    2. COVID respiratory tract infection.  Likely contributing to the somnolence.  Rule out subdural, especially since she did have head trauma and is on blood thinners.      3. Intolerance to morphine.      4. History of dementia.      5. Diffuse degenerative joint disease     Number 6 urinary retention.  Secondary to general condition    7. Chronic atrial fib and now with bump in cardiac enzymes.  Likely demand ischemia.    8. Currently NPO pending swallow eval    9. Constipation     Plan:  1. Will repeat CT head without contrast rule out hemorrhage.      2. Swallow study hopefully we can resume meds later today.  Once he can swallow we can discontinue Lovenox and resume Eliquis.      We could also begin oral  pain meds including Tylenol.  In the meantime physical therapy an LSO brace.  Continue remdesivir for the COVID.  I will also add a laxative from below.

## 2022-12-30 NOTE — NURSING
Patient family requesting stool softener, paged on call  Will let day shift know to follow up. Spoke to Magda

## 2022-12-30 NOTE — PLAN OF CARE
Problem: Physical Therapy  Goal: Physical Therapy Goal  Description: Goals to be met by: 23     Patient will increase functional independence with mobility by performin. Supine to sit with Stand-by Assistance  2. Sit to supine with Stand-by Assistance  3. Sit to stand transfer with Supervision  4. Gait  x 200 feet with Supervision using Rolling Walker.     Outcome: Ongoing, Progressing

## 2022-12-30 NOTE — PLAN OF CARE
12/30/22 1425   Discharge Reassessment   Assessment Type Discharge Planning Reassessment   Did the patient's condition or plan change since previous assessment? No   Discharge Plan A Rehab   Discharge Plan B Home Health   DME Needed Upon Discharge  none   Discharge Barriers Identified None   Why the patient remains in the hospital Requires continued medical care   Post-Acute Status   Post-Acute Authorization Placement   Post-Acute Placement Status Referrals Sent  ([facility])

## 2022-12-30 NOTE — PT/OT/SLP EVAL
Physical Therapy Evaluation    Patient Name:  Asael Ramos   MRN:  [id number]    Recommendations:     Discharge Recommendations: rehabilitation facility   Discharge Equipment Recommendations:  (TBD)   Barriers to discharge:  increased need of assistance    Assessment:     Asael Ramos is a 82 y.o. male admitted following a fall. He was found to have L1 compression fracture and he also tested positive for COVID. Neuro treating conservatively with LSO. 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 Family reports that they think he has undiagnosed dementia as he is usually only oriented to person. Per family, patient lives with spouse in [facility] and is independent. He usually ambulates without an AD, however, family recently bought him a rollator. At time of PT evaluation, patient presents with the following impairments/functional limitations: weakness, impaired endurance, impaired self care skills, impaired functional mobility, gait instability, impaired balance, impaired cognition, decreased lower extremity function, decreased safety awareness, pain. He required MOD A for bed mobility. MIN A for sit<>stand. Ambulated ~30 ft with RW & CGA. Slowed pace. 1 minor LOB. I am recommending rehab placement as I feel he would benefit from aggressive therapy in order to return to [facility]. Family in agreement. Progress patient as tolerated.     Rehab Prognosis: Good; patient would benefit from acute skilled PT services to address these deficits and reach maximum level of function.    Recent Surgery: * No surgery found *      Plan:     During this hospitalization, patient to be seen daily to address the identified rehab impairments via gait training, therapeutic activities, therapeutic exercises, neuromuscular re-education and progress toward the following goals:    Plan of Care Expires:  01/29/23    Subjective     Chief Complaint: pain  Patient/Family Comments/goals: none  Pain/Comfort:  Pain Rating 1:  (Unable to  rate)  Location 1:  (back)  Pain Addressed 1: Distraction, Reposition  Pain Rating Post-Intervention 1:  (Unable to rate)    Patients cultural, spiritual, Uatsdin conflicts given the current situation: no    Living Environment:  Lives with spouse in [facility]  Prior to admission, patients level of function was independent.  Equipment used at home: rollator.  DME owned (not currently used): none.  Upon discharge, patient will have assistance from TBD.    Objective:     Communicated with nurse prior to session.  Patient found HOB elevated with telemetry  upon PT entry to room.    General Precautions: Standard, fall, COVID (droplet + contact)  Orthopedic Precautions:spinal precautions   Braces: LSO  Respiratory Status: Nasal cannula, flow 2 L/min. Satting in mid 90s    Exams:  Cognitive Exam:  Patient is oriented to Person  Sensation:    -       Intact  RLE ROM: WFL  RLE Strength: -4/5 grossly  LLE ROM: WFL  LLE Strength: -4/5 grossly    Functional Mobility:  Bed Mobility:     Supine to Sit: moderate assistance  Transfers:     Sit to Stand:  minimum assistance with rolling walker  Gait: ~30 ft with RW & CGA  Balance: fair/poor      AM-PAC 6 CLICK MOBILITY  Total Score:15       Patient left up in chair with all lines intact, call button in reach, family present  GOALS:   Multidisciplinary Problems       Physical Therapy Goals          Problem: Physical Therapy    Goal Priority Disciplines Outcome Goal Variances Interventions   Physical Therapy Goal     PT, PT/OT Ongoing, Progressing     Description: Goals to be met by: 23     Patient will increase functional independence with mobility by performin. Supine to sit with Stand-by Assistance  2. Sit to supine with Stand-by Assistance  3. Sit to stand transfer with Supervision  4. Gait  x 200 feet with Supervision using Rolling Walker.                          History:     Past Medical History:   Diagnosis Date    A-fib     Brain concussion     CAD (coronary artery  disease)     Malignant melanoma of skin, unspecified     Mild cognitive impairment     Mixed hyperlipidemia     Pulmonary hypertension     Shingles     Sleep apnea, unspecified        Past Surgical History:   Procedure Laterality Date    CATARACT EXTRACTION      CORONARY STENT PLACEMENT      HIP REPLACEMENT ARTHROPLASTY      TOTAL SHOULDER ARTHROPLASTY Bilateral        Time Tracking:     PT Received On: 12/30/22  PT Start Time: 1025     PT Stop Time: 1050  PT Total Time (min): 25 min     Billable Minutes: Evaluation 25 minutes      12/30/2022

## 2022-12-30 NOTE — NURSING
Nurses Note -- 4 Eyes         2:31 AM      Skin assessed during: Admit      [x] No Pressure Injuries Present    []Prevention Measures Documented      [] Yes- Altered Skin Integrity Present or Discovered   [] LDA Added if Not in Epic (Describe Wound)   [] New Altered Skin Integrity was Present on Admit and Documented in LDA   [] Wound Image Taken    Wound Care Consulted? No    Attending Nurse:  Nataliya Escalante RN     Second RN/Staff Member:  Sultana Obrien RN      Post-Care Instructions: I reviewed with the patient in detail post-care instructions. Patient is not to engage in any heavy lifting, exercise, or swimming for the next 14 days. Should the patient develop any fevers, chills, bleeding, severe pain patient will contact the office.

## 2022-12-30 NOTE — NURSING
Nurses Note -- 4 Eyes      12/29/2022   6:20 PM      Skin assessed during: Admit      [x] No Pressure Injuries Present    [x]Prevention Measures Documented      [] Yes- Altered Skin Integrity Present or Discovered   [] LDA Added if Not in Epic (Describe Wound)   [] New Altered Skin Integrity was Present on Admit and Documented in LDA   [] Wound Image Taken    Wound Care Consulted? No    Attending Nurse:  Sanaz Mcgarry RN     Second RN/Staff Member:  Gladys Larios

## 2022-12-30 NOTE — PLAN OF CARE
12/30/22 1121   Discharge Assessment   Assessment Type Discharge Planning Assessment   Confirmed/corrected address, phone number and insurance Yes   Confirmed Demographics Correct on Facesheet   Source of Information patient   Communicated NATALIIA with patient/caregiver Date not available/Unable to determine   People in Home spouse   Do you expect to return to your current living situation? No   Do you have help at home or someone to help you manage your care at home? Yes   Walking or Climbing Stairs ambulation difficulty, requires equipment   Readmission within 30 days? No   Patient currently being followed by outpatient case management? No   Do you currently have service(s) that help you manage your care at home? No   How do you get to doctors appointments? family or friend will provide   Are you on dialysis? No   Do you take coumadin? No   Discharge Plan A Rehab   Discharge Plan B Home Health;Home with family   DME Needed Upon Discharge  none   Discharge Barriers Identified None

## 2022-12-30 NOTE — PLAN OF CARE
Problem: SLP  Goal: SLP Goal  Description: LTG: Pt will tolerate least restrictive PO diet with no clinical signs/sx aspiration    STGs:  Pt will tolerate ice chips with no clinical signs/sx aspiration  Pt will tolerate puree solids with improved bolus formation/transport  Pt will participate in MBS to assess swallow function as appropriate.    Outcome: Ongoing, Progressing       POC initiated and goals created.  Joy

## 2022-12-31 PROBLEM — R13.10 DYSPHAGIA: Status: ACTIVE | Noted: 2022-12-31

## 2022-12-31 PROBLEM — F03.90 DEMENTIA: Status: ACTIVE | Noted: 2022-12-31

## 2022-12-31 PROBLEM — U07.1 COVID-19 VIRUS INFECTION: Status: ACTIVE | Noted: 2022-12-31

## 2022-12-31 LAB
ANION GAP SERPL CALC-SCNC: 8 MEQ/L
BASOPHILS # BLD AUTO: 0.01 X10(3)/MCL (ref 0–0.2)
BASOPHILS NFR BLD AUTO: 0.2 %
BUN SERPL-MCNC: 24.8 MG/DL (ref 8.4–25.7)
CALCIUM SERPL-MCNC: 8.6 MG/DL (ref 8.8–10)
CHLORIDE SERPL-SCNC: 113 MMOL/L (ref 98–107)
CO2 SERPL-SCNC: 23 MMOL/L (ref 23–31)
CREAT SERPL-MCNC: 0.81 MG/DL (ref 0.73–1.18)
CREAT/UREA NIT SERPL: 31
EOSINOPHIL # BLD AUTO: 0 X10(3)/MCL (ref 0–0.9)
EOSINOPHIL NFR BLD AUTO: 0 %
ERYTHROCYTE [DISTWIDTH] IN BLOOD BY AUTOMATED COUNT: 15.4 % (ref 11.6–14.4)
GFR SERPLBLD CREATININE-BSD FMLA CKD-EPI: >60 MLS/MIN/1.73/M2
GLUCOSE SERPL-MCNC: 126 MG/DL (ref 82–115)
HCT VFR BLD AUTO: 39.1 % (ref 42–52)
HGB BLD-MCNC: 13.3 GM/DL (ref 14–18)
IMM GRANULOCYTES # BLD AUTO: 0.03 X10(3)/MCL (ref 0–0.04)
IMM GRANULOCYTES NFR BLD AUTO: 0.5 %
LYMPHOCYTES # BLD AUTO: 0.87 X10(3)/MCL (ref 0.6–4.6)
LYMPHOCYTES NFR BLD AUTO: 13.5 %
MCH RBC QN AUTO: 32.1 PG
MCHC RBC AUTO-ENTMCNC: 34 MG/DL (ref 33–36)
MCV RBC AUTO: 94.4 FL (ref 80–94)
MONOCYTES # BLD AUTO: 0.44 X10(3)/MCL (ref 0.1–1.3)
MONOCYTES NFR BLD AUTO: 6.8 %
NEUTROPHILS # BLD AUTO: 5.08 X10(3)/MCL (ref 2.1–9.2)
NEUTROPHILS NFR BLD AUTO: 79 %
NRBC BLD AUTO-RTO: 0 % (ref 0–1)
PLATELET # BLD AUTO: 169 X10(3)/MCL (ref 140–371)
PMV BLD AUTO: 11.6 FL (ref 9.4–12.4)
POTASSIUM SERPL-SCNC: 3.7 MMOL/L (ref 3.5–5.1)
RBC # BLD AUTO: 4.14 X10(6)/MCL (ref 4.7–6.1)
SODIUM SERPL-SCNC: 144 MMOL/L (ref 136–145)
TROPONIN I SERPL-MCNC: 0.16 NG/ML (ref 0–0.04)
WBC # SPEC AUTO: 6.4 X10(3)/MCL (ref 4.5–11.5)

## 2022-12-31 PROCEDURE — 63600175 PHARM REV CODE 636 W HCPCS: Performed by: INTERNAL MEDICINE

## 2022-12-31 PROCEDURE — 84484 ASSAY OF TROPONIN QUANT: CPT | Performed by: INTERNAL MEDICINE

## 2022-12-31 PROCEDURE — 85025 COMPLETE CBC W/AUTO DIFF WBC: CPT | Performed by: INTERNAL MEDICINE

## 2022-12-31 PROCEDURE — 21400001 HC TELEMETRY ROOM

## 2022-12-31 PROCEDURE — C9113 INJ PANTOPRAZOLE SODIUM, VIA: HCPCS | Performed by: INTERNAL MEDICINE

## 2022-12-31 PROCEDURE — 36415 COLL VENOUS BLD VENIPUNCTURE: CPT | Performed by: INTERNAL MEDICINE

## 2022-12-31 PROCEDURE — 80048 BASIC METABOLIC PNL TOTAL CA: CPT | Performed by: INTERNAL MEDICINE

## 2022-12-31 PROCEDURE — 27000207 HC ISOLATION

## 2022-12-31 PROCEDURE — 97530 THERAPEUTIC ACTIVITIES: CPT | Mod: CQ

## 2022-12-31 PROCEDURE — 99233 SBSQ HOSP IP/OBS HIGH 50: CPT | Mod: ,,, | Performed by: STUDENT IN AN ORGANIZED HEALTH CARE EDUCATION/TRAINING PROGRAM

## 2022-12-31 PROCEDURE — 99233 PR SUBSEQUENT HOSPITAL CARE,LEVL III: ICD-10-PCS | Mod: ,,, | Performed by: STUDENT IN AN ORGANIZED HEALTH CARE EDUCATION/TRAINING PROGRAM

## 2022-12-31 PROCEDURE — 25000003 PHARM REV CODE 250: Performed by: INTERNAL MEDICINE

## 2022-12-31 PROCEDURE — 97116 GAIT TRAINING THERAPY: CPT | Mod: CQ

## 2022-12-31 RX ADMIN — PANTOPRAZOLE SODIUM 40 MG: 40 INJECTION, POWDER, FOR SOLUTION INTRAVENOUS at 10:12

## 2022-12-31 RX ADMIN — ENOXAPARIN SODIUM 80 MG: 80 INJECTION SUBCUTANEOUS at 09:12

## 2022-12-31 RX ADMIN — MUPIROCIN: 20 OINTMENT TOPICAL at 09:12

## 2022-12-31 NOTE — PT/OT/SLP PROGRESS
Physical Therapy Treatment    Patient Name:  Asael Ramos   MRN:  [id number]    Recommendations:     Discharge Recommendations: rehabilitation facility  Discharge Equipment Recommendations: walker, rolling  Barriers to discharge: None    Assessment:     Asael Ramos is a 82 y.o. male admitted with a medical diagnosis of <principal problem not specified>.  He presents with the following impairments/functional limitations: weakness, impaired endurance, impaired balance, decreased safety awareness, impaired functional mobility, impaired cognition .    Rehab Prognosis: Good; patient would benefit from acute skilled PT services to address these deficits and reach maximum level of function.    Recent Surgery: * No surgery found *      Plan:     During this hospitalization, patient to be seen daily to address the identified rehab impairments via gait training, therapeutic activities and progress toward the following goals:    Plan of Care Expires:  01/29/23    Subjective     Chief Complaint:   Patient/Family Comments/goals:   Pain/Comfort:         Objective:     Communicated with nurse prior to session.  Patient found HOB elevated with telemetry upon PT entry to room.     General Precautions: Standard, fall  Orthopedic Precautions: spinal precautions  Braces: LSO  Respiratory Status: Room air     Functional Mobility:  Bed Mobility:     Supine to Sit: moderate assistance  Transfers:     Sit to Stand:  minimum assistance with rolling walker  Gait: pt amb x20 ft with RW and Pauly with verbal and visual cues for staying with RW SARITHA and safety awareness        Patient left up in chair with all lines intact, call button in reach, and son present..    GOALS:   Multidisciplinary Problems       Physical Therapy Goals          Problem: Physical Therapy    Goal Priority Disciplines Outcome Goal Variances Interventions   Physical Therapy Goal     PT, PT/OT Ongoing, Progressing     Description: Goals to be met by: 1/29/23      Patient will increase functional independence with mobility by performin. Supine to sit with Stand-by Assistance  2. Sit to supine with Stand-by Assistance  3. Sit to stand transfer with Supervision  4. Gait  x 200 feet with Supervision using Rolling Walker.                          Time Tracking:     PT Received On: 22  PT Start Time: 918     PT Stop Time: 943  PT Total Time (min): 25 min     Billable Minutes: Gait Training 10 and Therapeutic Activity 15    Treatment Type: Treatment  PT/PTA: PTA     PTA Visit Number: 1     2022

## 2022-12-31 NOTE — ASSESSMENT & PLAN NOTE
Speech following  Recommend to keep patient NPO  Unclear reason for dysphagia, CT head 12/30 with no abnormalities  Possible deconditioning from COVID

## 2022-12-31 NOTE — PLAN OF CARE
Problem: Adult Inpatient Plan of Care  Goal: Plan of Care Review  Outcome: Ongoing, Progressing     Problem: Skin Injury Risk Increased  Goal: Skin Health and Integrity  Outcome: Ongoing, Progressing     Problem: Infection  Goal: Absence of Infection Signs and Symptoms  Outcome: Ongoing, Progressing

## 2022-12-31 NOTE — SUBJECTIVE & OBJECTIVE
Interval History: On room air, denies shortness of breath. Still having some confusion but improved per wife. No other complaints     Review of Systems   Constitutional:  Negative for chills, diaphoresis and fever.   HENT:  Negative for ear pain and rhinorrhea.    Respiratory:  Negative for cough, chest tightness and shortness of breath.    Cardiovascular:  Negative for chest pain, palpitations and leg swelling.   Gastrointestinal:  Negative for abdominal pain, constipation, diarrhea, nausea and vomiting.   Genitourinary:  Negative for difficulty urinating, dysuria and flank pain.   Musculoskeletal:  Negative for back pain and joint swelling.   Neurological:  Negative for dizziness, weakness, light-headedness, numbness and headaches.   Psychiatric/Behavioral:  Negative for confusion.    Objective:     Vital Signs (Most Recent):  Temp: 96.6 °F (35.9 °C) (12/31/22 1100)  Pulse: 88 (12/31/22 1100)  Resp: 20 (12/31/22 1100)  BP: 133/80 (12/31/22 1100)  SpO2: (!) 91 % (12/31/22 1100)   Vital Signs (24h Range):  Temp:  [96.6 °F (35.9 °C)-98.5 °F (36.9 °C)] 96.6 °F (35.9 °C)  Pulse:  [80-96] 88  Resp:  [20] 20  SpO2:  [91 %-95 %] 91 %  BP: (128-147)/(80-92) 133/80     Weight: 79.4 kg (175 lb)  Body mass index is 23.09 kg/m².    Intake/Output Summary (Last 24 hours) at 12/31/2022 1344  Last data filed at 12/31/2022 0800  Gross per 24 hour   Intake 1623 ml   Output 1520 ml   Net 103 ml      Physical Exam  Constitutional:       Appearance: Normal appearance.   HENT:      Head: Normocephalic and atraumatic.   Cardiovascular:      Rate and Rhythm: Normal rate and regular rhythm.      Pulses: Normal pulses.   Pulmonary:      Effort: Pulmonary effort is normal.      Breath sounds: Normal breath sounds.   Abdominal:      General: Abdomen is flat. Bowel sounds are normal. There is no distension.      Palpations: Abdomen is soft.      Tenderness: There is no abdominal tenderness.   Musculoskeletal:         General: No tenderness.  Spoke to patient   Normal range of motion.      Right lower leg: No edema.      Left lower leg: No edema.   Lymphadenopathy:      Cervical: No cervical adenopathy.   Neurological:      General: No focal deficit present.      Mental Status: He is alert and oriented to person, place, and time.       Significant Labs: All pertinent labs within the past 24 hours have been reviewed.    Significant Imaging: I have reviewed all pertinent imaging results/findings within the past 24 hours.

## 2022-12-31 NOTE — HPI
Mr Vyas is a 82-year-old man who was well until 3 4 days prior to admission when he developed a low-grade fever.  He became somewhat weaker than normal and fell on his way to the restroom about 2 days ago.  Following that he had increasingly severe low back pain.  He was brought to the emergency room where he was assessed and found to have an L1 compression fracture as well as a COVID respiratory tract infection.  He was not coughing much at home but began to cough more overnight.  His cough seemed to be exacerbated by pain meds.  He was receiving IV morphine and his breathing seemed to worsen and he began coughing more and became very somnolent and more confused than baseline.  He was given a dose of Narcan and his mental status improved.  He only had received 1 mg of morphine IV prior to the change in mental status.  Since that treatment he has however improved and seems to be close to baseline.

## 2022-12-31 NOTE — PROGRESS NOTES
[facility] Hospital Medicine  Progress Note    Patient Name: Asael Ramos  MRN: [id number]  Patient Class: IP- Inpatient   Admission Date: 12/28/2022  Length of Stay: 3 days  Attending Physician: Aline Montoya MD  Primary Care Provider: Aline Montoya MD        Subjective:     Principal Problem:COVID-19 virus infection        HPI:  Mr Vyas is a 82-year-old man who was well until 3 4 days prior to admission when he developed a low-grade fever.  He became somewhat weaker than normal and fell on his way to the restroom about 2 days ago.  Following that he had increasingly severe low back pain.  He was brought to the emergency room where he was assessed and found to have an L1 compression fracture as well as a COVID respiratory tract infection.  He was not coughing much at home but began to cough more overnight.  His cough seemed to be exacerbated by pain meds.  He was receiving IV morphine and his breathing seemed to worsen and he began coughing more and became very somnolent and more confused than baseline.  He was given a dose of Narcan and his mental status improved.  He only had received 1 mg of morphine IV prior to the change in mental status.  Since that treatment he has however improved and seems to be close to baseline.       Overview/Hospital Course:  No notes on file    Interval History: On room air, denies shortness of breath. Still having some confusion but improved per wife. No other complaints     Review of Systems   Constitutional:  Negative for chills, diaphoresis and fever.   HENT:  Negative for ear pain and rhinorrhea.    Respiratory:  Negative for cough, chest tightness and shortness of breath.    Cardiovascular:  Negative for chest pain, palpitations and leg swelling.   Gastrointestinal:  Negative for abdominal pain, constipation, diarrhea, nausea and vomiting.   Genitourinary:  Negative for difficulty urinating, dysuria and flank pain.    Musculoskeletal:  Negative for back pain and joint swelling.   Neurological:  Negative for dizziness, weakness, light-headedness, numbness and headaches.   Psychiatric/Behavioral:  Negative for confusion.    Objective:     Vital Signs (Most Recent):  Temp: 96.6 °F (35.9 °C) (12/31/22 1100)  Pulse: 88 (12/31/22 1100)  Resp: 20 (12/31/22 1100)  BP: 133/80 (12/31/22 1100)  SpO2: (!) 91 % (12/31/22 1100)   Vital Signs (24h Range):  Temp:  [96.6 °F (35.9 °C)-98.5 °F (36.9 °C)] 96.6 °F (35.9 °C)  Pulse:  [80-96] 88  Resp:  [20] 20  SpO2:  [91 %-95 %] 91 %  BP: (128-147)/(80-92) 133/80     Weight: 79.4 kg (175 lb)  Body mass index is 23.09 kg/m².    Intake/Output Summary (Last 24 hours) at 12/31/2022 1344  Last data filed at 12/31/2022 0800  Gross per 24 hour   Intake 1623 ml   Output 1520 ml   Net 103 ml      Physical Exam  Constitutional:       Appearance: Normal appearance.   HENT:      Head: Normocephalic and atraumatic.   Cardiovascular:      Rate and Rhythm: Normal rate and regular rhythm.      Pulses: Normal pulses.   Pulmonary:      Effort: Pulmonary effort is normal.      Breath sounds: Normal breath sounds.   Abdominal:      General: Abdomen is flat. Bowel sounds are normal. There is no distension.      Palpations: Abdomen is soft.      Tenderness: There is no abdominal tenderness.   Musculoskeletal:         General: No tenderness. Normal range of motion.      Right lower leg: No edema.      Left lower leg: No edema.   Lymphadenopathy:      Cervical: No cervical adenopathy.   Neurological:      General: No focal deficit present.      Mental Status: He is alert and oriented to person, place, and time.       Significant Labs: All pertinent labs within the past 24 hours have been reviewed.    Significant Imaging: I have reviewed all pertinent imaging results/findings within the past 24 hours.      Assessment/Plan:      * COVID-19 virus infection  Stable on room air  Weakness likely a symptom of COVID  Continue PT,  ST    Dementia  History of underlying dementia  Seems to be at baseline       Dysphagia  Speech following  Recommend to keep patient NPO  Unclear reason for dysphagia, CT head 12/30 with no abnormalities  Possible deconditioning from COVID       A-fib  Holding PO meds due to dysphagia  Speech following  Continue Lovenox 1 mg/kg BID      VTE Risk Mitigation (From admission, onward)         Ordered     enoxaparin injection 80 mg  Every 12 hours (non-standard times)         12/29/22 0710                Discharge Planning   NATALIIA:      Code Status: Not on file   Is the patient medically ready for discharge?:     Reason for patient still in hospital (select all that apply): Treatment  Discharge Plan A: Rehab                  Malinda Lozoya MD  Department of Hospital Medicine   [facility]

## 2023-01-01 PROCEDURE — 21400001 HC TELEMETRY ROOM

## 2023-01-01 PROCEDURE — 25000003 PHARM REV CODE 250: Performed by: STUDENT IN AN ORGANIZED HEALTH CARE EDUCATION/TRAINING PROGRAM

## 2023-01-01 PROCEDURE — S5010 5% DEXTROSE AND 0.45% SALINE: HCPCS | Performed by: STUDENT IN AN ORGANIZED HEALTH CARE EDUCATION/TRAINING PROGRAM

## 2023-01-01 PROCEDURE — 27000207 HC ISOLATION

## 2023-01-01 PROCEDURE — 25000003 PHARM REV CODE 250: Performed by: INTERNAL MEDICINE

## 2023-01-01 PROCEDURE — 63600175 PHARM REV CODE 636 W HCPCS: Performed by: INTERNAL MEDICINE

## 2023-01-01 PROCEDURE — 63600175 PHARM REV CODE 636 W HCPCS: Performed by: STUDENT IN AN ORGANIZED HEALTH CARE EDUCATION/TRAINING PROGRAM

## 2023-01-01 PROCEDURE — C9113 INJ PANTOPRAZOLE SODIUM, VIA: HCPCS | Performed by: INTERNAL MEDICINE

## 2023-01-01 PROCEDURE — 99233 PR SUBSEQUENT HOSPITAL CARE,LEVL III: ICD-10-PCS | Mod: ,,, | Performed by: STUDENT IN AN ORGANIZED HEALTH CARE EDUCATION/TRAINING PROGRAM

## 2023-01-01 PROCEDURE — 99233 SBSQ HOSP IP/OBS HIGH 50: CPT | Mod: ,,, | Performed by: STUDENT IN AN ORGANIZED HEALTH CARE EDUCATION/TRAINING PROGRAM

## 2023-01-01 RX ORDER — KETOROLAC TROMETHAMINE 30 MG/ML
15 INJECTION, SOLUTION INTRAMUSCULAR; INTRAVENOUS EVERY 6 HOURS PRN
Status: DISPENSED | OUTPATIENT
Start: 2023-01-01 | End: 2023-01-04

## 2023-01-01 RX ADMIN — ENOXAPARIN SODIUM 80 MG: 80 INJECTION SUBCUTANEOUS at 09:01

## 2023-01-01 RX ADMIN — MUPIROCIN: 20 OINTMENT TOPICAL at 09:01

## 2023-01-01 RX ADMIN — KETOROLAC TROMETHAMINE 15 MG: 30 INJECTION, SOLUTION INTRAMUSCULAR at 01:01

## 2023-01-01 RX ADMIN — ENOXAPARIN SODIUM 80 MG: 80 INJECTION SUBCUTANEOUS at 08:01

## 2023-01-01 RX ADMIN — MUPIROCIN: 20 OINTMENT TOPICAL at 08:01

## 2023-01-01 RX ADMIN — KETOROLAC TROMETHAMINE 15 MG: 30 INJECTION, SOLUTION INTRAMUSCULAR; INTRAVENOUS at 06:01

## 2023-01-01 RX ADMIN — DEXTROSE AND SODIUM CHLORIDE: 5; 450 INJECTION, SOLUTION INTRAVENOUS at 06:01

## 2023-01-01 RX ADMIN — POLYETHYLENE GLYCOL 3350 17 G: 17 POWDER, FOR SOLUTION ORAL at 08:01

## 2023-01-01 RX ADMIN — KETOROLAC TROMETHAMINE 15 MG: 30 INJECTION, SOLUTION INTRAMUSCULAR; INTRAVENOUS at 11:01

## 2023-01-01 RX ADMIN — PANTOPRAZOLE SODIUM 40 MG: 40 INJECTION, POWDER, FOR SOLUTION INTRAVENOUS at 08:01

## 2023-01-01 NOTE — ASSESSMENT & PLAN NOTE
Speech following  Recommend to keep patient NPO  Unclear reason for dysphagia, CT head 12/30 with no abnormalities  Possible deconditioning from COVID and progression of dementia  MBS tomorrow

## 2023-01-01 NOTE — SUBJECTIVE & OBJECTIVE
Interval History: Stable, no new complaints. Wife is concerned about swallowing difficulty, speech following planning for MBS tomorrow     Review of Systems   Constitutional:  Negative for chills, diaphoresis and fever.   HENT:  Negative for ear pain and rhinorrhea.    Respiratory:  Negative for cough, chest tightness and shortness of breath.    Cardiovascular:  Negative for chest pain, palpitations and leg swelling.   Gastrointestinal:  Negative for abdominal pain, constipation, diarrhea, nausea and vomiting.   Genitourinary:  Negative for difficulty urinating, dysuria and flank pain.   Musculoskeletal:  Negative for back pain and joint swelling.   Neurological:  Negative for dizziness, weakness, light-headedness, numbness and headaches.   Psychiatric/Behavioral:  Negative for confusion.    Objective:     Vital Signs (Most Recent):  Temp: 98.1 °F (36.7 °C) (01/01/23 0800)  Pulse: 88 (01/01/23 0800)  Resp: 20 (12/31/22 1100)  BP: (!) 135/91 (01/01/23 0800)  SpO2: 95 % (01/01/23 0800)   Vital Signs (24h Range):  Temp:  [96.4 °F (35.8 °C)-98.1 °F (36.7 °C)] 98.1 °F (36.7 °C)  Pulse:  [86-92] 88  SpO2:  [94 %-97 %] 95 %  BP: (135-158)/(84-99) 135/91     Weight: 79.4 kg (175 lb)  Body mass index is 23.09 kg/m².  No intake or output data in the 24 hours ending 01/01/23 1409   Physical Exam  Constitutional:       Appearance: Normal appearance.   HENT:      Head: Normocephalic and atraumatic.   Cardiovascular:      Rate and Rhythm: Normal rate and regular rhythm.      Pulses: Normal pulses.   Pulmonary:      Effort: Pulmonary effort is normal.      Breath sounds: Normal breath sounds.   Abdominal:      General: Abdomen is flat. Bowel sounds are normal. There is no distension.      Palpations: Abdomen is soft.      Tenderness: There is no abdominal tenderness.   Musculoskeletal:         General: No tenderness. Normal range of motion.      Right lower leg: No edema.      Left lower leg: No edema.   Lymphadenopathy:       Cervical: No cervical adenopathy.   Neurological:      General: No focal deficit present.      Mental Status: He is alert.       Significant Labs: All pertinent labs within the past 24 hours have been reviewed.    Significant Imaging: I have reviewed all pertinent imaging results/findings within the past 24 hours.

## 2023-01-01 NOTE — PROGRESS NOTES
[facility] Hospital Medicine  Progress Note    Patient Name: Asael Ramos  MRN: [id number]  Patient Class: IP- Inpatient   Admission Date: 12/28/2022  Length of Stay: 4 days  Attending Physician: Aline Montoya MD  Primary Care Provider: Aline Montoya MD        Subjective:     Principal Problem:COVID-19 virus infection        HPI:  Mr Vyas is a 82-year-old man who was well until 3 4 days prior to admission when he developed a low-grade fever.  He became somewhat weaker than normal and fell on his way to the restroom about 2 days ago.  Following that he had increasingly severe low back pain.  He was brought to the emergency room where he was assessed and found to have an L1 compression fracture as well as a COVID respiratory tract infection.  He was not coughing much at home but began to cough more overnight.  His cough seemed to be exacerbated by pain meds.  He was receiving IV morphine and his breathing seemed to worsen and he began coughing more and became very somnolent and more confused than baseline.  He was given a dose of Narcan and his mental status improved.  He only had received 1 mg of morphine IV prior to the change in mental status.  Since that treatment he has however improved and seems to be close to baseline.       Overview/Hospital Course:  No notes on file    Interval History: Stable, no new complaints. Wife is concerned about swallowing difficulty, speech following planning for MBS tomorrow     Review of Systems   Constitutional:  Negative for chills, diaphoresis and fever.   HENT:  Negative for ear pain and rhinorrhea.    Respiratory:  Negative for cough, chest tightness and shortness of breath.    Cardiovascular:  Negative for chest pain, palpitations and leg swelling.   Gastrointestinal:  Negative for abdominal pain, constipation, diarrhea, nausea and vomiting.   Genitourinary:  Negative for difficulty urinating, dysuria and flank  pain.   Musculoskeletal:  Negative for back pain and joint swelling.   Neurological:  Negative for dizziness, weakness, light-headedness, numbness and headaches.   Psychiatric/Behavioral:  Negative for confusion.    Objective:     Vital Signs (Most Recent):  Temp: 98.1 °F (36.7 °C) (01/01/23 0800)  Pulse: 88 (01/01/23 0800)  Resp: 20 (12/31/22 1100)  BP: (!) 135/91 (01/01/23 0800)  SpO2: 95 % (01/01/23 0800)   Vital Signs (24h Range):  Temp:  [96.4 °F (35.8 °C)-98.1 °F (36.7 °C)] 98.1 °F (36.7 °C)  Pulse:  [86-92] 88  SpO2:  [94 %-97 %] 95 %  BP: (135-158)/(84-99) 135/91     Weight: 79.4 kg (175 lb)  Body mass index is 23.09 kg/m².  No intake or output data in the 24 hours ending 01/01/23 1409   Physical Exam  Constitutional:       Appearance: Normal appearance.   HENT:      Head: Normocephalic and atraumatic.   Cardiovascular:      Rate and Rhythm: Normal rate and regular rhythm.      Pulses: Normal pulses.   Pulmonary:      Effort: Pulmonary effort is normal.      Breath sounds: Normal breath sounds.   Abdominal:      General: Abdomen is flat. Bowel sounds are normal. There is no distension.      Palpations: Abdomen is soft.      Tenderness: There is no abdominal tenderness.   Musculoskeletal:         General: No tenderness. Normal range of motion.      Right lower leg: No edema.      Left lower leg: No edema.   Lymphadenopathy:      Cervical: No cervical adenopathy.   Neurological:      General: No focal deficit present.      Mental Status: He is alert.       Significant Labs: All pertinent labs within the past 24 hours have been reviewed.    Significant Imaging: I have reviewed all pertinent imaging results/findings within the past 24 hours.      Assessment/Plan:      * COVID-19 virus infection  Stable on room air  Weakness likely a symptom of COVID  Continue PT, ST    Dementia  History of underlying dementia  Seems to be at baseline       Dysphagia  Speech following  Recommend to keep patient NPO  Unclear  reason for dysphagia, CT head 12/30 with no abnormalities  Possible deconditioning from COVID and progression of dementia  MBS tomorrow        A-fib  Holding PO meds due to dysphagia  Speech following  Continue Lovenox 1 mg/kg BID        VTE Risk Mitigation (From admission, onward)         Ordered     enoxaparin injection 80 mg  Every 12 hours (non-standard times)         12/29/22 0710                Discharge Planning   NATALIIA:      Code Status: Not on file   Is the patient medically ready for discharge?:     Reason for patient still in hospital (select all that apply): Treatment  Discharge Plan A: Rehab                  Malinda Lozoya MD  Department of Hospital Medicine   [facility]

## 2023-01-01 NOTE — PLAN OF CARE
Problem: Adult Inpatient Plan of Care  Goal: Plan of Care Review  Outcome: Ongoing, Progressing  Goal: Patient-Specific Goal (Individualized)  Outcome: Ongoing, Progressing  Flowsheets (Taken 1/1/2023 0112)  Anxieties, Fears or Concerns: not being able to eat, pt confusion  Individualized Care Needs: fall risk, maintaining NPO status until safe, IV fluids  Patient-Specific Goals (Include Timeframe): no falls throughout entire hospital stay  Goal: Absence of Hospital-Acquired Illness or Injury  Outcome: Ongoing, Progressing  Goal: Optimal Comfort and Wellbeing  Outcome: Ongoing, Progressing  Goal: Readiness for Transition of Care  Outcome: Ongoing, Progressing     Problem: Skin Injury Risk Increased  Goal: Skin Health and Integrity  Outcome: Ongoing, Progressing     Problem: Infection  Goal: Absence of Infection Signs and Symptoms  Outcome: Ongoing, Progressing     Problem: Fall Injury Risk  Goal: Absence of Fall and Fall-Related Injury  Outcome: Ongoing, Progressing

## 2023-01-01 NOTE — NURSING
Pt calm when pain is controlled.  Pain well controlled with prn toradol iv.  Waist belt removed, wife at bedside continuously, will call if pt attempts to get out of bed.

## 2023-01-02 LAB
ALBUMIN SERPL-MCNC: 3 G/DL (ref 3.4–4.8)
ALBUMIN/GLOB SERPL: 1.4 RATIO (ref 1.1–2)
ALP SERPL-CCNC: 90 UNIT/L (ref 40–150)
ALT SERPL-CCNC: 41 UNIT/L (ref 0–55)
AST SERPL-CCNC: 53 UNIT/L (ref 5–34)
BILIRUBIN DIRECT+TOT PNL SERPL-MCNC: 2.7 MG/DL
BUN SERPL-MCNC: 16.5 MG/DL (ref 8.4–25.7)
CALCIUM SERPL-MCNC: 8.2 MG/DL (ref 8.8–10)
CHLORIDE SERPL-SCNC: 111 MMOL/L (ref 98–107)
CO2 SERPL-SCNC: 22 MMOL/L (ref 23–31)
CREAT SERPL-MCNC: 0.71 MG/DL (ref 0.73–1.18)
GFR SERPLBLD CREATININE-BSD FMLA CKD-EPI: >60 MLS/MIN/1.73/M2
GLOBULIN SER-MCNC: 2.2 GM/DL (ref 2.4–3.5)
GLUCOSE SERPL-MCNC: 92 MG/DL (ref 82–115)
MAGNESIUM SERPL-MCNC: 1.8 MG/DL (ref 1.6–2.6)
POTASSIUM SERPL-SCNC: 3.2 MMOL/L (ref 3.5–5.1)
PROT SERPL-MCNC: 5.2 GM/DL (ref 5.8–7.6)
SODIUM SERPL-SCNC: 141 MMOL/L (ref 136–145)

## 2023-01-02 PROCEDURE — S5010 5% DEXTROSE AND 0.45% SALINE: HCPCS | Performed by: STUDENT IN AN ORGANIZED HEALTH CARE EDUCATION/TRAINING PROGRAM

## 2023-01-02 PROCEDURE — A9698 NON-RAD CONTRAST MATERIALNOC: HCPCS | Performed by: INTERNAL MEDICINE

## 2023-01-02 PROCEDURE — 92611 MOTION FLUOROSCOPY/SWALLOW: CPT

## 2023-01-02 PROCEDURE — 63600175 PHARM REV CODE 636 W HCPCS: Performed by: INTERNAL MEDICINE

## 2023-01-02 PROCEDURE — 63600175 PHARM REV CODE 636 W HCPCS: Performed by: STUDENT IN AN ORGANIZED HEALTH CARE EDUCATION/TRAINING PROGRAM

## 2023-01-02 PROCEDURE — 27000207 HC ISOLATION

## 2023-01-02 PROCEDURE — 25000003 PHARM REV CODE 250: Performed by: INTERNAL MEDICINE

## 2023-01-02 PROCEDURE — 99233 PR SUBSEQUENT HOSPITAL CARE,LEVL III: ICD-10-PCS | Mod: ,,, | Performed by: STUDENT IN AN ORGANIZED HEALTH CARE EDUCATION/TRAINING PROGRAM

## 2023-01-02 PROCEDURE — 25500020 PHARM REV CODE 255: Performed by: INTERNAL MEDICINE

## 2023-01-02 PROCEDURE — 92526 ORAL FUNCTION THERAPY: CPT

## 2023-01-02 PROCEDURE — 83735 ASSAY OF MAGNESIUM: CPT | Performed by: INTERNAL MEDICINE

## 2023-01-02 PROCEDURE — 25000003 PHARM REV CODE 250: Performed by: STUDENT IN AN ORGANIZED HEALTH CARE EDUCATION/TRAINING PROGRAM

## 2023-01-02 PROCEDURE — C9113 INJ PANTOPRAZOLE SODIUM, VIA: HCPCS | Performed by: INTERNAL MEDICINE

## 2023-01-02 PROCEDURE — 36415 COLL VENOUS BLD VENIPUNCTURE: CPT | Performed by: INTERNAL MEDICINE

## 2023-01-02 PROCEDURE — 99233 SBSQ HOSP IP/OBS HIGH 50: CPT | Mod: ,,, | Performed by: STUDENT IN AN ORGANIZED HEALTH CARE EDUCATION/TRAINING PROGRAM

## 2023-01-02 PROCEDURE — 80053 COMPREHEN METABOLIC PANEL: CPT | Performed by: INTERNAL MEDICINE

## 2023-01-02 PROCEDURE — 21400001 HC TELEMETRY ROOM

## 2023-01-02 RX ORDER — POTASSIUM CHLORIDE 14.9 MG/ML
20 INJECTION INTRAVENOUS ONCE
Status: COMPLETED | OUTPATIENT
Start: 2023-01-02 | End: 2023-01-02

## 2023-01-02 RX ADMIN — KETOROLAC TROMETHAMINE 15 MG: 30 INJECTION, SOLUTION INTRAMUSCULAR; INTRAVENOUS at 09:01

## 2023-01-02 RX ADMIN — PANTOPRAZOLE SODIUM 40 MG: 40 INJECTION, POWDER, FOR SOLUTION INTRAVENOUS at 09:01

## 2023-01-02 RX ADMIN — MUPIROCIN: 20 OINTMENT TOPICAL at 09:01

## 2023-01-02 RX ADMIN — KETOROLAC TROMETHAMINE 15 MG: 30 INJECTION, SOLUTION INTRAMUSCULAR; INTRAVENOUS at 12:01

## 2023-01-02 RX ADMIN — DEXTROSE AND SODIUM CHLORIDE: 5; 450 INJECTION, SOLUTION INTRAVENOUS at 06:01

## 2023-01-02 RX ADMIN — ENOXAPARIN SODIUM 80 MG: 80 INJECTION SUBCUTANEOUS at 09:01

## 2023-01-02 RX ADMIN — POTASSIUM CHLORIDE 20 MEQ: 14.9 INJECTION, SOLUTION INTRAVENOUS at 04:01

## 2023-01-02 RX ADMIN — BARIUM SULFATE 10 ML: 0.81 POWDER, FOR SUSPENSION ORAL at 02:01

## 2023-01-02 RX ADMIN — KETOROLAC TROMETHAMINE 15 MG: 30 INJECTION, SOLUTION INTRAMUSCULAR; INTRAVENOUS at 06:01

## 2023-01-02 RX ADMIN — POLYETHYLENE GLYCOL 3350 17 G: 17 POWDER, FOR SOLUTION ORAL at 09:01

## 2023-01-02 RX ADMIN — KETOROLAC TROMETHAMINE 15 MG: 30 INJECTION, SOLUTION INTRAMUSCULAR; INTRAVENOUS at 03:01

## 2023-01-02 NOTE — PT/OT/SLP EVAL
Speech [facility] Pathology Department  Dysphagia Therapy Progress Note    Patient Name:  Asael Ramos   MRN:  [id number]  Admitting Diagnosis: COVID-19 virus infection    Recommendations:     General recommendations:  Modified Barium Swallow Study  Diet recommendations:  NPO, Liquid Diet Level: NPO   Aspiration precautions: medications crushed in puree    Discharge recommendations:  rehabilitation facility   Barriers to safe discharge:  acuity of illness    Subjective     Patient alert and cooperative.    Pain/Comfort: Pain Rating 1: 0/10    Spiritual/Cultural/Congregational Beliefs/Practices that affect care: no    Respiratory Status: room air    Objective:     Oral Musculature Evaluation:  Oral Musculature: general weakness  Dentition: scattered dentition  Secretion Management: adequate  Mucosal Quality: dry  Lingual Strength and Mobility: impaired strength    Therapeutic Activities:  Pt participated in PO trials of thin liquid and puree solids. Signs/symptoms of aspiration observed with x1 out 3 trials of thin liquid.     Assessment:     Comprehensive evaluation of swallow function warranted to further evaluate aspiration risk and swallow function.     Goals:   Multidisciplinary Problems       SLP Goals          Problem: SLP    Goal Priority Disciplines Outcome   SLP Goal     SLP Ongoing, Progressing   Description: LTG: Pt will tolerate least restrictive PO diet with no clinical signs/sx aspiration    STGs:  Pt will tolerate ice chips with no clinical signs/sx aspiration  Pt will tolerate puree solids with improved bolus formation/transport  Pt will participate in MBS to assess swallow function as appropriate.                       Patient Education:     Patient and spouse provided with verbal education regarding results/recommendations.  Understanding was verbalized.    Plan:     Will continue to follow and tx as appropriate.    SLP Follow-Up:  Yes   Patient to be seen:  daily        Time Tracking:     SLP Treatment  Date:   01/02/23  Speech Start Time:  0900  Speech Stop Time:  0920     Speech Total Time (min):  20 min    Billable minutes:  Treatment of Swallow Dysfunction, 20 01/02/2023

## 2023-01-02 NOTE — PROGRESS NOTES
[facility] Medicine  Progress Note    Patient Name: Asael Ramos  MRN: [id number]  Patient Class: IP- Inpatient   Admission Date: 12/28/2022  Length of Stay: 5 days  Attending Physician: Aline Montoya MD  Primary Care Provider: Aline Montoya MD        Subjective:     Principal Problem:COVID-19 virus infection        HPI:  Mr Vyas is a 82-year-old man who was well until 3 4 days prior to admission when he developed a low-grade fever.  He became somewhat weaker than normal and fell on his way to the restroom about 2 days ago.  Following that he had increasingly severe low back pain.  He was brought to the emergency room where he was assessed and found to have an L1 compression fracture as well as a COVID respiratory tract infection.  He was not coughing much at home but began to cough more overnight.  His cough seemed to be exacerbated by pain meds.  He was receiving IV morphine and his breathing seemed to worsen and he began coughing more and became very somnolent and more confused than baseline.  He was given a dose of Narcan and his mental status improved.  He only had received 1 mg of morphine IV prior to the change in mental status.  Since that treatment he has however improved and seems to be close to baseline.       Overview/Hospital Course:  No notes on file    Interval History: No new complaints, will have MBS today     Review of Systems   Constitutional:  Negative for chills, diaphoresis and fever.   HENT:  Negative for ear pain and rhinorrhea.    Respiratory:  Negative for cough, chest tightness and shortness of breath.    Cardiovascular:  Negative for chest pain, palpitations and leg swelling.   Gastrointestinal:  Negative for abdominal pain, constipation, diarrhea, nausea and vomiting.   Genitourinary:  Negative for difficulty urinating, dysuria and flank pain.   Musculoskeletal:  Negative for back pain and joint swelling.    Neurological:  Negative for dizziness, weakness, light-headedness, numbness and headaches.   Psychiatric/Behavioral:  Negative for confusion.    Objective:     Vital Signs (Most Recent):  Temp: 98.2 °F (36.8 °C) (01/02/23 0846)  Pulse: 86 (01/02/23 0846)  Resp: 18 (01/02/23 0846)  BP: (!) 143/85 (01/02/23 0846)  SpO2: 95 % (01/02/23 0846)   Vital Signs (24h Range):  Temp:  [96.9 °F (36.1 °C)-98.2 °F (36.8 °C)] 98.2 °F (36.8 °C)  Pulse:  [74-88] 86  Resp:  [18-20] 18  SpO2:  [93 %-96 %] 95 %  BP: (132-145)/(81-95) 143/85     Weight: 79.4 kg (175 lb)  Body mass index is 23.09 kg/m².    Intake/Output Summary (Last 24 hours) at 1/2/2023 1441  Last data filed at 1/1/2023 2221  Gross per 24 hour   Intake --   Output 700 ml   Net -700 ml      Physical Exam  Constitutional:       Appearance: Normal appearance.   HENT:      Head: Normocephalic and atraumatic.   Cardiovascular:      Rate and Rhythm: Normal rate and regular rhythm.      Pulses: Normal pulses.   Pulmonary:      Effort: Pulmonary effort is normal.      Breath sounds: Normal breath sounds.   Abdominal:      General: Abdomen is flat. Bowel sounds are normal. There is no distension.      Palpations: Abdomen is soft.      Tenderness: There is no abdominal tenderness.   Musculoskeletal:         General: No tenderness. Normal range of motion.      Right lower leg: No edema.      Left lower leg: No edema.   Lymphadenopathy:      Cervical: No cervical adenopathy.   Neurological:      General: No focal deficit present.      Mental Status: He is alert and oriented to person, place, and time.       Significant Labs: All pertinent labs within the past 24 hours have been reviewed.    Significant Imaging: I have reviewed all pertinent imaging results/findings within the past 24 hours.      Assessment/Plan:      * COVID-19 virus infection  Stable on room air  Weakness likely a symptom of COVID  Continue PT, ST    Dementia  History of underlying dementia  Seems to be at  baseline       Dysphagia  Speech following  Recommend to keep patient NPO  Unclear reason for dysphagia, CT head 12/30 with no abnormalities  Possible deconditioning from COVID and progression of dementia  MBS today      A-fib  Holding PO meds due to dysphagia  Speech following  Continue Lovenox 1 mg/kg BID        VTE Risk Mitigation (From admission, onward)         Ordered     enoxaparin injection 80 mg  Every 12 hours (non-standard times)         12/29/22 0710                Discharge Planning   NATALIIA:      Code Status: Not on file   Is the patient medically ready for discharge?:     Reason for patient still in hospital (select all that apply): Treatment  Discharge Plan A: Rehab                  Malinda Lozoya MD  Department of Hospital Medicine   [facility]

## 2023-01-02 NOTE — SUBJECTIVE & OBJECTIVE
Interval History: No new complaints, will have MBS today     Review of Systems   Constitutional:  Negative for chills, diaphoresis and fever.   HENT:  Negative for ear pain and rhinorrhea.    Respiratory:  Negative for cough, chest tightness and shortness of breath.    Cardiovascular:  Negative for chest pain, palpitations and leg swelling.   Gastrointestinal:  Negative for abdominal pain, constipation, diarrhea, nausea and vomiting.   Genitourinary:  Negative for difficulty urinating, dysuria and flank pain.   Musculoskeletal:  Negative for back pain and joint swelling.   Neurological:  Negative for dizziness, weakness, light-headedness, numbness and headaches.   Psychiatric/Behavioral:  Negative for confusion.    Objective:     Vital Signs (Most Recent):  Temp: 98.2 °F (36.8 °C) (01/02/23 0846)  Pulse: 86 (01/02/23 0846)  Resp: 18 (01/02/23 0846)  BP: (!) 143/85 (01/02/23 0846)  SpO2: 95 % (01/02/23 0846)   Vital Signs (24h Range):  Temp:  [96.9 °F (36.1 °C)-98.2 °F (36.8 °C)] 98.2 °F (36.8 °C)  Pulse:  [74-88] 86  Resp:  [18-20] 18  SpO2:  [93 %-96 %] 95 %  BP: (132-145)/(81-95) 143/85     Weight: 79.4 kg (175 lb)  Body mass index is 23.09 kg/m².    Intake/Output Summary (Last 24 hours) at 1/2/2023 1441  Last data filed at 1/1/2023 2221  Gross per 24 hour   Intake --   Output 700 ml   Net -700 ml      Physical Exam  Constitutional:       Appearance: Normal appearance.   HENT:      Head: Normocephalic and atraumatic.   Cardiovascular:      Rate and Rhythm: Normal rate and regular rhythm.      Pulses: Normal pulses.   Pulmonary:      Effort: Pulmonary effort is normal.      Breath sounds: Normal breath sounds.   Abdominal:      General: Abdomen is flat. Bowel sounds are normal. There is no distension.      Palpations: Abdomen is soft.      Tenderness: There is no abdominal tenderness.   Musculoskeletal:         General: No tenderness. Normal range of motion.      Right lower leg: No edema.      Left lower leg: No  edema.   Lymphadenopathy:      Cervical: No cervical adenopathy.   Neurological:      General: No focal deficit present.      Mental Status: He is alert and oriented to person, place, and time.       Significant Labs: All pertinent labs within the past 24 hours have been reviewed.    Significant Imaging: I have reviewed all pertinent imaging results/findings within the past 24 hours.

## 2023-01-02 NOTE — PROCEDURES
Speech Language Pathology Department  Modified Barium Swallow Study    Patient Name:  Asael Ramos   MRN:  [id number]  Diagnosis: COVID-19 virus infection       Recommendations:     General recommendations:  SLP follow up for diet tolerance and dysphagia therapy  Repeat MBS study: 2-6 weeks or as clinically indicated  Diet recommendations:  NPO, Liquid Diet Level: NPO   Swallow strategies/precautions: small bites/sips, alternate solids/liquids, and medications crushed in puree  General precautions: Standard, aspiration    Reason for Referral:     A Modified Barium Swallow Study was completed to assess the efficiency of his/her swallow function, rule out aspiration and make recommendations regarding safe dietary consistencies, effective compensatory strategies, and safe eating environment.     History:     Past Medical History:   Diagnosis Date    A-fib     Brain concussion     CAD (coronary artery disease)     Malignant melanoma of skin, unspecified     Mild cognitive impairment     Mixed hyperlipidemia     Pulmonary hypertension     Shingles     Sleep apnea, unspecified      Past Surgical History:   Procedure Laterality Date    CATARACT EXTRACTION      CORONARY STENT PLACEMENT      HIP REPLACEMENT ARTHROPLASTY      TOTAL SHOULDER ARTHROPLASTY Bilateral        Home Diet: Regular and thin liquids  Current Method of Nutrition: NPO    Patient complaint: No complaints at this time.    Subjective:     Patient alert and cooperative.    Spiritual/Cultural/Uatsdin Beliefs/Practices that affect care: no    Pain/Comfort: Pain Rating 1: 0/10    Respiratory Status: Room air    Fluoroscopic Results:     Oral Musculature Evaluation:  Oral Musculature: general weakness  Dentition: scattered dentition  Secretion Management: adequate  Mucosal Quality: dry  Lingual Strength and Mobility: impaired strength    Visualization  Patient was seen in the lateral view    Oral Phase:   Prolonged/disorganized bolus formation  Prolonged  mastication  Piecemeal deglutition  Tongue pumping    Pharyngeal Phase:   Swallow delay with spill to the pyriform sinuses  Adequate epiglottic deflection  Adequate airway protection  Min-mild vallecular residue with liquid and solid trials  Min-mod pyriform sinus residue with liquid and solid trials. Increased residue noted with solids requiring bite/sip alternating to effectively reduce severity  Reduced UES opening  Consistency Laryngeal Penetration Aspiration Comments   Mildly thick liquid by spoon None None Min residue in valleculae and pyriform sinuses   Mildly thick liquid by cup None None Mild residue in valleculae and pyriform sinuses; secondary swallows reduced residue   Thin liquid by cup None None Min residue in valleculae and pyriform sinuses; secondary swallows reduced residue.   Thin liquid by straw None None Min residue in valleculae and pyriform sinuses; secondary swallows reduced residue   Puree None None Mild-mod residue in pyriform sinuses; secondary swallows reduced residue. Alternating liquid/solid reduced the severity more effectively.    Chewable solid None None residue in valleculae and pyriform sinuses; secondary swallows reduced residue. Again, Alternating liquid/solid reduced the severity more effectively.      Cervical Esophageal Phase:   Suspected development of a cricopharyngeal bar which may warrant further imaging. Will defer to MD.    Compensatory Strategies:  Multiple swallows and bite/sip alternation to reduce and/or clear pharyngeal residue. Bite/sip alternating most effective for reducing residue.    Assessment:     Pt presents with oropharyngeal dysphagia as characterized by prolong bolus formation, lingual pumping, inconsistencies with a/p transfer, reduced bolus control, weak tongue base retraction, and prolong oral transit duration resulting in prespill to the level of the pyriform sinuses and min-mod pharyngeal residue. Multiple swallows and bite/sip alternation to reduce  and/or clear pharyngeal residue. Bite/sip alternating most effective for reducing residue. No laryngeal penetration/aspiration was visualized during this study. Pt remains at increased risk given the residue within his pharyngeal cavity after the swallow and overall weakness. Suspected development of a cricopharyngeal bar which may warrant further imaging. Will defer to MD.    Goals: To tolerate least restrictive diet with no signs/symptoms of aspiration.     Pt will tolerate trials of soft and bite sized solids with improved bolus control.   Pt will participate in chrystal with 100% accuracy given min cues.  Pt will participate in effortful swallow exercises with 100% accuracy given min assistance.   Pt will participate in CTAR exercises with 100% accuracy given min assitance.     Patient Education:     Patient provided with verbal education regarding results/recommendations.  Understanding was verbalized.    Plan:     SLP Follow-Up:  Yes        Time Tracking:     SLP Treatment Date:   01/02/23    Billable minutes:   Motion Fluoroscopic Evaluation, Video Recording, 30 01/02/2023

## 2023-01-02 NOTE — PT/OT/SLP PROGRESS
Physical Therapy      Patient Name:  Asael Ramos   MRN:  [id number]    Patient not seen today secondary to Off the floor for procedure/surgery. Will follow-up as schedule allows.

## 2023-01-02 NOTE — ASSESSMENT & PLAN NOTE
Speech following  Recommend to keep patient NPO  Unclear reason for dysphagia, CT head 12/30 with no abnormalities  Possible deconditioning from COVID and progression of dementia  [facility] today

## 2023-01-03 PROCEDURE — 21400001 HC TELEMETRY ROOM

## 2023-01-03 PROCEDURE — 63600175 PHARM REV CODE 636 W HCPCS: Performed by: STUDENT IN AN ORGANIZED HEALTH CARE EDUCATION/TRAINING PROGRAM

## 2023-01-03 PROCEDURE — 97166 OT EVAL MOD COMPLEX 45 MIN: CPT

## 2023-01-03 PROCEDURE — 99233 SBSQ HOSP IP/OBS HIGH 50: CPT | Mod: ,,, | Performed by: INTERNAL MEDICINE

## 2023-01-03 PROCEDURE — 25000003 PHARM REV CODE 250: Performed by: STUDENT IN AN ORGANIZED HEALTH CARE EDUCATION/TRAINING PROGRAM

## 2023-01-03 PROCEDURE — 97530 THERAPEUTIC ACTIVITIES: CPT | Mod: CQ

## 2023-01-03 PROCEDURE — 97535 SELF CARE MNGMENT TRAINING: CPT

## 2023-01-03 PROCEDURE — 25000003 PHARM REV CODE 250: Performed by: NURSE PRACTITIONER

## 2023-01-03 PROCEDURE — C9113 INJ PANTOPRAZOLE SODIUM, VIA: HCPCS | Performed by: INTERNAL MEDICINE

## 2023-01-03 PROCEDURE — 99233 PR SUBSEQUENT HOSPITAL CARE,LEVL III: ICD-10-PCS | Mod: ,,, | Performed by: INTERNAL MEDICINE

## 2023-01-03 PROCEDURE — S5010 5% DEXTROSE AND 0.45% SALINE: HCPCS | Performed by: STUDENT IN AN ORGANIZED HEALTH CARE EDUCATION/TRAINING PROGRAM

## 2023-01-03 PROCEDURE — 63600175 PHARM REV CODE 636 W HCPCS: Performed by: INTERNAL MEDICINE

## 2023-01-03 PROCEDURE — 97116 GAIT TRAINING THERAPY: CPT | Mod: CQ

## 2023-01-03 PROCEDURE — 27000207 HC ISOLATION

## 2023-01-03 RX ADMIN — HYDROXYZINE HYDROCHLORIDE 10 MG: 10 TABLET ORAL at 08:01

## 2023-01-03 RX ADMIN — KETOROLAC TROMETHAMINE 15 MG: 30 INJECTION, SOLUTION INTRAMUSCULAR; INTRAVENOUS at 05:01

## 2023-01-03 RX ADMIN — ENOXAPARIN SODIUM 80 MG: 80 INJECTION SUBCUTANEOUS at 09:01

## 2023-01-03 RX ADMIN — KETOROLAC TROMETHAMINE 15 MG: 30 INJECTION, SOLUTION INTRAMUSCULAR; INTRAVENOUS at 08:01

## 2023-01-03 RX ADMIN — PANTOPRAZOLE SODIUM 40 MG: 40 INJECTION, POWDER, FOR SOLUTION INTRAVENOUS at 08:01

## 2023-01-03 RX ADMIN — KETOROLAC TROMETHAMINE 15 MG: 30 INJECTION, SOLUTION INTRAMUSCULAR; INTRAVENOUS at 01:01

## 2023-01-03 RX ADMIN — DEXTROSE AND SODIUM CHLORIDE: 5; 450 INJECTION, SOLUTION INTRAVENOUS at 03:01

## 2023-01-03 RX ADMIN — MUPIROCIN: 20 OINTMENT TOPICAL at 09:01

## 2023-01-03 RX ADMIN — DEXTROSE AND SODIUM CHLORIDE: 5; 450 INJECTION, SOLUTION INTRAVENOUS at 12:01

## 2023-01-03 NOTE — PT/OT/SLP PROGRESS
Physical Therapy Treatment    Patient Name:  Asael Ramos   MRN:  [id number]    Recommendations:     Discharge Recommendations: rehabilitation facility  Discharge Equipment Recommendations: walker, rolling  Barriers to discharge: None    Assessment:     Asael Ramos is a 82 y.o. male admitted with a medical diagnosis of COVID-19 virus infection.  He presents with the following impairments/functional limitations: weakness, impaired endurance, impaired balance, gait instability, impaired cognition, decreased safety awareness, impaired functional mobility, orthopedic precautions .    Rehab Prognosis: Good; patient would benefit from acute skilled PT services to address these deficits and reach maximum level of function.    Recent Surgery: * No surgery found *      Plan:     During this hospitalization, patient to be seen daily to address the identified rehab impairments via gait training, therapeutic activities and progress toward the following goals:    Plan of Care Expires:  01/29/23    Subjective     Chief Complaint:   Patient/Family Comments/goals:   Pain/Comfort:         Objective:     Communicated with nurse prior to session.  Patient found HOB elevated with telemetry upon PT entry to room.     General Precautions: Standard, fall  Orthopedic Precautions: spinal precautions  Braces: LSO  Respiratory Status: Room air     Functional Mobility:  Bed Mobility:     Supine to Sit: moderate assistance and of 2 persons  Transfers:     Sit to Stand:  minimum assistance with rolling walker  Gait: pt amb x2 trials for 20ft with RW and 30ft with R HHA with noted trunk flexion and B knees flexed. Pt required significant cuing for RW manipulation.       Patient left up in chair with all lines intact, call button in reach, and wife present..  Extensive discussion held with wife about discharge recommendations. Discussed with CM.     GOALS:   Multidisciplinary Problems       Physical Therapy Goals          Problem: Physical  Therapy    Goal Priority Disciplines Outcome Goal Variances Interventions   Physical Therapy Goal     PT, PT/OT Ongoing, Progressing     Description: Goals to be met by: 23     Patient will increase functional independence with mobility by performin. Supine to sit with Stand-by Assistance  2. Sit to supine with Stand-by Assistance  3. Sit to stand transfer with Supervision  4. Gait  x 200 feet with Supervision using Rolling Walker.                          Time Tracking:     PT Received On: 23  PT Start Time: 1338     PT Stop Time: 1429  PT Total Time (min): 51 min     Billable Minutes: Gait Training 30 and Therapeutic Activity 21    Treatment Type: Treatment  PT/PTA: PTA     PTA Visit Number: 2     2023

## 2023-01-03 NOTE — PLAN OF CARE
Problem: Occupational Therapy  Goal: Occupational Therapy Goal  Description: Goals to be met by: 1/17/2022     Patient will increase functional independence with ADLs by performing:    LE Dressing with Supervision.  Grooming while standing at sink with Supervision.  Toileting from toilet with Set-up Assistance for hygiene and clothing management.   Bathing from  shower chair/bench with Supervision.  Toilet transfer to toilet with Supervision.    Outcome: Ongoing, Progressing

## 2023-01-03 NOTE — PLAN OF CARE
Problem: SLP  Goal: SLP Goal  Description: LTG: Pt will tolerate least restrictive PO diet with no clinical signs/sx aspiration    STGs:  Pt will tolerate ice chips with no clinical signs/sx aspiration (D/c'd 1/2/23).  Pt will tolerate puree solids with improved bolus formation/transport (D/c'd 1/2/23).  Pt will participate in MBS to assess swallow function as appropriate (Met 1/2/23).    Pt will tolerate trials of soft and bite sized solids with improved bolus formation/transport and no signs/symptoms of aspiration.   Pt will participate in chrystal with 100% accuracy given min cues.  Pt will participate in effortful swallow exercises with 100% accuracy given min assistance.   Pt will participate in CTAR exercises with 100% accuracy given min assitance.     Outcome: Ongoing, Progressing

## 2023-01-03 NOTE — PT/OT/SLP PROGRESS
Diet follow up with RN. Pt is tolerating his PO diet modifications without signs/symptoms of aspiration. Will continue to follow and treat as appropriate.

## 2023-01-03 NOTE — PROGRESS NOTES
[facility] Hospital Medicine Progress Note        Chief Complaint: Inpatient Follow-up for physical deconditioning and COVID-19    HPI:   Mr. Ramos is a 82-year-old man that presented with 4 days complaints of fever with physical deconditioning and increasing severe low back pain.  Patient was noted to have an L1 compression fracture upon admission as well as noted to have COVID-19 respiratory tract infection.  Received some morphine which aggravated his cough as well as his somnolence behavior and confusion.  He did receive a dose of Narcan during the hospital stay with improvement in his mental status.  Had some issues with physical deconditioning and needed physical, occupational and speech therapy during the stay.  Recommendations were made to possibly look for rehab placement     Interval Hx:   The patient was seen and examined.  His wife is at bedside.  Has been cleared from SLP standpoint for a modified diet and seems to be tolerating that without any issues.  Still has some coughing spells which could possibly be from his COVID-19 infection.  Therapy recommendations are noted for rehab placement.  Case management on board.    Objective/physical exam:  General: In no acute distress, afebrile  Chest: Clear to auscultation bilaterally  Heart: RRR, +S1, S2, no appreciable murmur  Abdomen: Soft, nontender, BS +  MSK:  Generalized weakness   Neurologic: Alert and oriented x4, nonfocal    VITAL SIGNS: 24 HRS MIN & MAX LAST   Temp  Min: 97.3 °F (36.3 °C)  Max: 98.8 °F (37.1 °C) 97.9 °F (36.6 °C)   BP  Min: 127/78  Max: 157/97 127/78   Pulse  Min: 66  Max: 89  67   Resp  Min: 17  Max: 18 17   SpO2  Min: 93 %  Max: 96 % (!) 94 %         Recent Labs   Lab 12/29/22  0022 12/30/22  0444 12/31/22  0416   WBC 6.4 8.7 6.4   RBC 4.24* 4.33* 4.14*   HGB 13.2* 13.6* 13.3*   HCT 40.5* 40.6* 39.1*   MCV 95.5* 93.8 94.4*   MCH 31.1 31.4 32.1   MCHC 32.6* 33.5 34.0   RDW 15.5* 15.3* 15.4*   PLT 83*  86* 169   MPV 10.6 10.9 11.6       Recent Labs   Lab 12/28/22  1411 12/29/22  0007 12/30/22  0444 12/31/22  0416 01/02/23  0559    140 142 144 141   K 4.2 4.2 4.1 3.7 3.2*   CO2 18* 16* 22* 23 22*   BUN 18.4 20.4 29.9* 24.8 16.5   CREATININE 0.91 0.87 0.92 0.81 0.71*   CALCIUM 9.0 9.0 9.1 8.6* 8.2*   MG 2.00  --   --   --  1.80   ALBUMIN 3.5 3.5 3.2*  --  3.0*   ALKPHOS 106 101 89  --  90   ALT 31 42 55  --  41   AST 61* 95* 113*  --  53*   BILITOT 1.2 1.1 1.1  --  2.7*          Microbiology Results (last 7 days)       ** No results found for the last 168 hours. **             See below for Radiology    Scheduled Med:   enoxaparin  80 mg Subcutaneous Q12H    mupirocin   Nasal BID    pantoprazole  40 mg Intravenous Daily    perflutren lipid microspheres  1.3 mL Intravenous Once    polyethylene glycol  17 g Oral Daily    polyethylene glycol  17 g Oral QHS        Continuous Infusions:   dextrose 5 % and 0.45 % NaCl 100 mL/hr at 01/03/23 1522        PRN Meds:  hydrOXYzine HCL, ketorolac, ondansetron       Assessment/Plan:     * COVID-19 virus infection  Stable on room air  Weakness and cough likely symptoms of COVID  Continue PT, ST and SLP     Dementia  History of underlying dementia  Seems to be at baseline         Dysphagia  Speech following  Was cleared from SLP standpoint for a modified dysphagia diet and seems to be doing well with it      A-fib  Can resume home medications since now able to eat     VTE prophylaxis: resuming eliquis, DC lovenox     Patient condition:  Fair    Anticipated discharge and Disposition:   Rehab       All diagnosis and differential diagnosis have been reviewed; assessment and plan has been documented; I have personally reviewed the labs and test results that are presently available; I have reviewed the patients medication list; I have reviewed the consulting providers response and recommendations. I have reviewed or attempted to review medical records based upon their  availability    All of the patient's questions have been  addressed and answered. Patient's is agreeable to the above stated plan. I will continue to monitor closely and make adjustments to medical management as needed.  _____________________________________________________________________    Nutrition Status:    Radiology:  Fl Modified Barium Swallow Speech  See procedure notes from Speech Pathologist.    This procedure was auto-finalized.      Aline Montoya MD   01/03/2023

## 2023-01-03 NOTE — PT/OT/SLP EVAL
Occupational Therapy   Evaluation    Name: Asael Ramos  MRN: [id number]  Admitting Diagnosis: COVID-19 virus infection  Recent Surgery: * No surgery found *      Recommendations:     Discharge Recommendations: rehabilitation facility  Discharge Equipment Recommendations:  walker, rolling, shower chair  Barriers to discharge:  None    Assessment:     Asael Ramos is a 82 y.o. male with a medical diagnosis of L1 compression fracture following a fall. Neurosurgery is treating non-operatively with an LSO. Additionally, pt found positive COVID-19 virus infection upon admit.  Performance deficits affecting function: gait instability, impaired balance, weakness, impaired cognition, impaired self care skills, impaired functional mobility, impaired endurance.      Rehab Prognosis: Good; patient would benefit from acute skilled OT services to address these deficits and reach maximum level of function.       Plan:     Patient to be seen 5 x/week, daily to address the above listed problems via self-care/home management, therapeutic activities  Plan of Care Expires: 01/17/23  Plan of Care Reviewed with: patient, spouse    Subjective     Chief Complaint: none stated  Patient/Family Comments/goals: for patient to return to [facility]    Occupational Profile:  Living Environment: Pt lives with wife in a H with no steps to enter. Primary bathroom is a w/c accessible shower. No shower chair, but family is agreeable to get one.   Previous level of function: Independent without an assistive device; wife is always around for supervision, but pt completes ADLs.   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  Patient found HOB elevated with telemetry upon  OT entry to room.    General Precautions: Standard,    Orthopedic Precautions: spinal precautions  Braces: LSO  Respiratory Status: Room air    Occupational Performance:    Bed Mobility:    Patient completed Supine to Sit with moderate assistance and 2 persons    Functional Mobility/Transfers:  Patient completed Sit <> Stand Transfer with minimum assistance  with  rolling walker   Patient completed Toilet Transfer Step Transfer technique with minimum assistance with  rolling walker  Functional Mobility: Pt ambulated to/from bathroom with min A and RW on way there and HHA on way back. Pt requires assistance and significant cuing for RW manipulation.     Activities of Daily Living:  Lower Body Dressing: maximal assistance due to spinal pxns.  Toileting: maximal assistance for clothing management.     Cognitive/Visual Perceptual:  Cognitive/Psychosocial Skills:     -       Oriented to: Person and hospital with choices   -       Follows Commands/attention:Follows two-step commands    Physical Exam:  Upper Extremity Strength:    -       Right Upper Extremity: WFL  -       Left Upper Extremity: WFL      Treatment & Education:  Extensive discussion held on discharge planning/education with wife.     Patient left up in chair with all lines intact, call button in reach, nurse notified, and wife present    GOALS:   Multidisciplinary Problems       Occupational Therapy Goals          Problem: Occupational Therapy    Goal Priority Disciplines Outcome Interventions   Occupational Therapy Goal     OT, PT/OT Ongoing, Progressing    Description: Goals to be met by: 1/17/2022     Patient will increase functional independence with ADLs by performing:    LE Dressing with Supervision.  Grooming while standing at sink with Supervision.  Toileting from toilet with Set-up Assistance for hygiene and clothing management.   Bathing from  shower chair/bench with Supervision.  Toilet transfer to toilet with Supervision.                          History:     Past Medical History:   Diagnosis Date    A-fib     Brain concussion     CAD (coronary artery disease)     Malignant melanoma of skin, unspecified     Mild cognitive impairment     Mixed hyperlipidemia     Pulmonary hypertension     Shingles     Sleep apnea, unspecified          Past Surgical History:   Procedure Laterality Date    CATARACT EXTRACTION      CORONARY STENT PLACEMENT      HIP REPLACEMENT ARTHROPLASTY      TOTAL SHOULDER ARTHROPLASTY Bilateral        Time Tracking:     OT Date of Treatment: 01/03/23  OT Start Time: 1338  OT Stop Time: 1429  OT Total Time (min): 51 min    Billable Minutes:Evaluation 13 min MOD  Self Care/Home Management 38 min    1/3/2023

## 2023-01-04 PROCEDURE — 97530 THERAPEUTIC ACTIVITIES: CPT

## 2023-01-04 PROCEDURE — 97530 THERAPEUTIC ACTIVITIES: CPT | Mod: CQ

## 2023-01-04 PROCEDURE — C9113 INJ PANTOPRAZOLE SODIUM, VIA: HCPCS | Performed by: INTERNAL MEDICINE

## 2023-01-04 PROCEDURE — S5010 5% DEXTROSE AND 0.45% SALINE: HCPCS | Performed by: STUDENT IN AN ORGANIZED HEALTH CARE EDUCATION/TRAINING PROGRAM

## 2023-01-04 PROCEDURE — 25000003 PHARM REV CODE 250: Performed by: INTERNAL MEDICINE

## 2023-01-04 PROCEDURE — 99233 SBSQ HOSP IP/OBS HIGH 50: CPT | Mod: ,,, | Performed by: INTERNAL MEDICINE

## 2023-01-04 PROCEDURE — 25000003 PHARM REV CODE 250: Performed by: STUDENT IN AN ORGANIZED HEALTH CARE EDUCATION/TRAINING PROGRAM

## 2023-01-04 PROCEDURE — 63600175 PHARM REV CODE 636 W HCPCS: Performed by: INTERNAL MEDICINE

## 2023-01-04 PROCEDURE — 99233 PR SUBSEQUENT HOSPITAL CARE,LEVL III: ICD-10-PCS | Mod: ,,, | Performed by: INTERNAL MEDICINE

## 2023-01-04 PROCEDURE — 27000207 HC ISOLATION

## 2023-01-04 PROCEDURE — 97535 SELF CARE MNGMENT TRAINING: CPT

## 2023-01-04 PROCEDURE — 21400001 HC TELEMETRY ROOM

## 2023-01-04 PROCEDURE — 97116 GAIT TRAINING THERAPY: CPT | Mod: CQ

## 2023-01-04 RX ORDER — ACETAMINOPHEN 325 MG/1
650 TABLET ORAL EVERY 4 HOURS PRN
Status: DISCONTINUED | OUTPATIENT
Start: 2023-01-04 | End: 2023-01-05 | Stop reason: HOSPADM

## 2023-01-04 RX ORDER — BISACODYL 10 MG
10 SUPPOSITORY, RECTAL RECTAL DAILY PRN
Status: DISCONTINUED | OUTPATIENT
Start: 2023-01-04 | End: 2023-01-05 | Stop reason: HOSPADM

## 2023-01-04 RX ORDER — BENZONATATE 100 MG/1
100 CAPSULE ORAL 3 TIMES DAILY PRN
Status: DISCONTINUED | OUTPATIENT
Start: 2023-01-04 | End: 2023-01-05 | Stop reason: HOSPADM

## 2023-01-04 RX ORDER — CODEINE PHOSPHATE AND GUAIFENESIN 10; 100 MG/5ML; MG/5ML
5 SOLUTION ORAL EVERY 4 HOURS PRN
Status: DISCONTINUED | OUTPATIENT
Start: 2023-01-04 | End: 2023-01-05 | Stop reason: HOSPADM

## 2023-01-04 RX ORDER — CLONIDINE HYDROCHLORIDE 0.1 MG/1
0.1 TABLET ORAL ONCE
Status: COMPLETED | OUTPATIENT
Start: 2023-01-04 | End: 2023-01-04

## 2023-01-04 RX ADMIN — DEXTROSE AND SODIUM CHLORIDE: 5; 450 INJECTION, SOLUTION INTRAVENOUS at 02:01

## 2023-01-04 RX ADMIN — CLONIDINE HYDROCHLORIDE 0.1 MG: 0.1 TABLET ORAL at 04:01

## 2023-01-04 RX ADMIN — Medication 10 MG: at 01:01

## 2023-01-04 RX ADMIN — APIXABAN 5 MG: 5 TABLET, FILM COATED ORAL at 01:01

## 2023-01-04 RX ADMIN — GUAIFENESIN AND CODEINE PHOSPHATE 5 ML: 100; 10 SOLUTION ORAL at 01:01

## 2023-01-04 RX ADMIN — PANTOPRAZOLE SODIUM 40 MG: 40 INJECTION, POWDER, FOR SOLUTION INTRAVENOUS at 10:01

## 2023-01-04 RX ADMIN — POLYETHYLENE GLYCOL 3350 17 G: 17 POWDER, FOR SOLUTION ORAL at 09:01

## 2023-01-04 RX ADMIN — BENZONATATE 100 MG: 100 CAPSULE ORAL at 02:01

## 2023-01-04 RX ADMIN — ACETAMINOPHEN 650 MG: 325 TABLET ORAL at 01:01

## 2023-01-04 RX ADMIN — GUAIFENESIN AND CODEINE PHOSPHATE 5 ML: 100; 10 SOLUTION ORAL at 09:01

## 2023-01-04 NOTE — PROGRESS NOTES
"Inpatient Nutrition Evaluation    Admit Date: 12/28/2022   Total duration of encounter: 7 days    Nutrition Recommendation/Prescription     - continue diet per SLP recs    Nutrition Assessment     Chart Review    Reason Seen: length of stay    Malnutrition Screening Tool Results                Diagnosis:  * COVID-19 virus infection  Dementia  Dysphagia  A-fib    Relevant Medical History: dementia, afib    Nutrition-Related Medications: pantoprazole, polyethylene glycol    Nutrition-Related Labs:  1/2: K 3.2, Cl 111, Crea 0.71, AST 53    Diet Order: Diet Minced & Moist  Oral Supplement Order: none  Appetite/Oral Intake: good/50-75% of meals  Factors Affecting Nutritional Intake: none identified  Food/Orthodoxy/Cultural Preferences: none reported  Food Allergies: none reported    Skin Integrity: bruised (ecchymotic)  Wound(s):       Comments    1/4: noted pt has not had BM, plan to be given a suppository today; good intake; EMR shows possible weight loss in the past 2 months, will attempt to verify on follow up, pt unable to provide much hx at this time    Anthropometrics    Height: 6' 1" (185.4 cm) Height Method: Stated  Last Weight: 79.4 kg (175 lb) (12/28/22 1641) Weight Method: Stated  BMI (Calculated): 23.1  BMI Classification: normal (BMI 18.5-24.9)        Ideal Body Weight (IBW), Male: 184 lb     % Ideal Body Weight, Male (lb): 95.11 %                          Usual Weight Provided By: EMR weight history    Wt Readings from Last 3 Encounters:   12/28/22 1641 79.4 kg (175 lb)   10/19/22 1517 85.7 kg (189 lb)   01/18/22 1400 86.5 kg (190 lb 11.2 oz)   05/11/21 0932 87.3 kg (192 lb 7.4 oz)   03/28/19 1056 81.2 kg (179 lb 0.2 oz)   01/15/19 0815 84.4 kg (186 lb 1.1 oz)      Weight Change(s) Since Admission:  Admit Weight: 79.4 kg (175 lb) (12/28/22 1641)  12/28: 79.4kg    Patient Education    Not applicable.    Monitoring & Evaluation     Dietitian will monitor food and beverage intake and weight change.  Nutrition " Risk/Follow-Up: low (follow-up in 5-7 days)  Patients assigned 'low nutrition risk' status do not qualify for a full nutritional assessment but will be monitored and re-evaluated in a 5-7 day time period. Please consult if re-evaluation needed sooner.

## 2023-01-04 NOTE — PLAN OF CARE
01/04/23 1608   Discharge Reassessment   Assessment Type Discharge Planning Reassessment   Did the patient's condition or plan change since previous assessment? No   Discharge Plan A Home Health   Discharge Plan B Home with family   DME Needed Upon Discharge  walker, rolling   Discharge Barriers Identified None   Why the patient remains in the hospital Requires continued medical care   Post-Acute Status   Post-Acute Authorization [facility] Status Referrals Sent  ([facility] for rolling walker)

## 2023-01-04 NOTE — PT/OT/SLP PROGRESS
Attempted to see pt for dysphagia therapy. Pt working with other therapies. Will reattempt at later time schedule permitting.

## 2023-01-04 NOTE — PROGRESS NOTES
[facility] Hospital Medicine Progress Note        Chief Complaint: Inpatient Follow-up for physical deconditioning and COVID 19    HPI:   Mr. Ramos is a 82-year-old man that presented with 4 days complaints of fever with physical deconditioning and increasing severe low back pain.  Patient was noted to have an L1 compression fracture upon admission as well as noted to have COVID-19 respiratory tract infection.  Received some morphine which aggravated his cough as well as his somnolence behavior and confusion.  He did receive a dose of Narcan during the hospital stay with improvement in his mental status.  Had some issues with physical deconditioning and needed physical, occupational and speech therapy during the stay.  Recommendations were made to possibly look for rehab placement   Family however reluctant for patient to go to rehab since they would not like patient to be in the facility by himself.  Because of his dementia it usually gets worse when he is left alone.  Wife was made to understand that rehab does not allow or make exceptions for family members to spend the night.  Also emphasized on the importance of why patient needs rehab.    Insurance has currently denied patient rehab however Case Management was trying to get a peer to peer set up however considering the fact that family members do not want to take him to rehab to begin with, Case Management will start looking at other safe discharge options       Interval Hx:   The patient was seen and examined.  Other than a cough no other acute needs or complaints.  Currently on Tessalon Perles, Robitussin will be added.  Now that Eliquis has been initiated, discontinue the Toradol.  Has not had a bowel movement and will be given a suppository.  Plan of care has been discussed with patient's wife.  She is vehemently denying sending patient to rehab since she would not like him to be in the facility by himself.  Understands that  patient is deconditioned however family is able to have 24 hours sitters and therapy at home.  Case management informed    Objective/physical exam:  General: In no acute distress, afebrile  Chest: Clear to auscultation bilaterally  Heart: RRR, +S1, S2, no appreciable murmur  Abdomen: Soft, nontender, BS +  MSK: Warm, no lower extremity edema, no clubbing or cyanosis  Neurologic:  Somewhat sleepy however no acute distress    VITAL SIGNS: 24 HRS MIN & MAX LAST   Temp  Min: 97.8 °F (36.6 °C)  Max: 99.2 °F (37.3 °C) 99.2 °F (37.3 °C)   BP  Min: 127/78  Max: 169/100 (!) 151/85     Pulse  Min: 67  Max: 94  79   Resp  Min: 18  Max: 18 18   SpO2  Min: 94 %  Max: 95 % (!) 94 %         Recent Labs   Lab 12/29/22  0022 12/30/22  0444 12/31/22  0416   WBC 6.4 8.7 6.4   RBC 4.24* 4.33* 4.14*   HGB 13.2* 13.6* 13.3*   HCT 40.5* 40.6* 39.1*   MCV 95.5* 93.8 94.4*   MCH 31.1 31.4 32.1   MCHC 32.6* 33.5 34.0   RDW 15.5* 15.3* 15.4*   PLT 83* 86* 169   MPV 10.6 10.9 11.6       Recent Labs   Lab 12/28/22  1411 12/29/22  0007 12/30/22  0444 12/31/22  0416 01/02/23  0559    140 142 144 141   K 4.2 4.2 4.1 3.7 3.2*   CO2 18* 16* 22* 23 22*   BUN 18.4 20.4 29.9* 24.8 16.5   CREATININE 0.91 0.87 0.92 0.81 0.71*   CALCIUM 9.0 9.0 9.1 8.6* 8.2*   MG 2.00  --   --   --  1.80   ALBUMIN 3.5 3.5 3.2*  --  3.0*   ALKPHOS 106 101 89  --  90   ALT 31 42 55  --  41   AST 61* 95* 113*  --  53*   BILITOT 1.2 1.1 1.1  --  2.7*          Microbiology Results (last 7 days)       ** No results found for the last 168 hours. **             See below for Radiology    Scheduled Med:   apixaban  5 mg Oral BID    pantoprazole  40 mg Intravenous Daily    perflutren lipid microspheres  1.3 mL Intravenous Once    polyethylene glycol  17 g Oral Daily    polyethylene glycol  17 g Oral QHS        Continuous Infusions:   dextrose 5 % and 0.45 % NaCl 100 mL/hr at 01/03/23 1522        PRN Meds:  acetaminophen, benzonatate, bisacodyL, guaiFENesin-codeine 100-10 mg/5  ml, hydrOXYzine HCL, ondansetron       Assessment/Plan:  * COVID-19 virus infection  Stable on room air  Weakness and cough likely symptoms of COVID  Continue PT, ST and SLP     Dementia  History of underlying dementia  Seems to be at baseline         Dysphagia  Speech following  Was cleared from SLP standpoint for a modified dysphagia diet and seems to be doing well with it      A-fib  Can resume home medications since now able to eat, resumed back on Eliquis for anticoagulation    VTE prophylaxis:  Eliquis    Patient condition:  Fair     Anticipated discharge and Disposition:   recommend rehab, family wants to take home      All diagnosis and differential diagnosis have been reviewed; assessment and plan has been documented; I have personally reviewed the labs and test results that are presently available; I have reviewed the patients medication list; I have reviewed the consulting providers response and recommendations. I have reviewed or attempted to review medical records based upon their availability    All of the patient's questions have been  addressed and answered. Patient's is agreeable to the above stated plan. I will continue to monitor closely and make adjustments to medical management as needed.  _____________________________________________________________________    Nutrition Status:    Radiology:  Fl Modified Barium Swallow Speech  See procedure notes from Speech Pathologist.    This procedure was auto-finalized.      Aline Montoya MD   01/04/2023

## 2023-01-04 NOTE — NURSING
Paged Dr Blackmon office to report pt having a 7 beat run of vtach during PT. Dr Hdez called back and ordered for cardiology to be reconsulted. Put the order in

## 2023-01-04 NOTE — PLAN OF CARE
Problem: Adult Inpatient Plan of Care  Goal: Plan of Care Review  1/4/2023 0733 by Brooklyn Servin RN  Outcome: Ongoing, Progressing  1/4/2023 0733 by Brooklyn Servin RN  Outcome: Ongoing, Progressing  Goal: Patient-Specific Goal (Individualized)  1/4/2023 0733 by Brooklyn Servin RN  Outcome: Ongoing, Progressing  1/4/2023 0733 by Brooklyn Servin RN  Outcome: Ongoing, Progressing  Goal: Absence of Hospital-Acquired Illness or Injury  1/4/2023 0733 by Brooklyn Servin RN  Outcome: Ongoing, Progressing  1/4/2023 0733 by Brooklyn Servin RN  Outcome: Ongoing, Progressing  Goal: Optimal Comfort and Wellbeing  1/4/2023 0733 by Brooklyn Servin RN  Outcome: Ongoing, Progressing  1/4/2023 0733 by Brooklyn Servin RN  Outcome: Ongoing, Progressing  Goal: Readiness for Transition of Care  1/4/2023 0733 by Brooklyn Servin RN  Outcome: Ongoing, Progressing  1/4/2023 0733 by Brooklyn Servin RN  Outcome: Ongoing, Progressing     Problem: Skin Injury Risk Increased  Goal: Skin Health and Integrity  1/4/2023 0733 by Brooklyn Servin RN  Outcome: Ongoing, Progressing  1/4/2023 0733 by Brooklyn Servin RN  Outcome: Ongoing, Progressing     Problem: Infection  Goal: Absence of Infection Signs and Symptoms  1/4/2023 0733 by Brooklyn Servin RN  Outcome: Ongoing, Progressing  1/4/2023 0733 by Brooklyn Servin RN  Outcome: Ongoing, Progressing     Problem: Fall Injury Risk  Goal: Absence of Fall and Fall-Related Injury  1/4/2023 0733 by Brooklyn Servin RN  Outcome: Ongoing, Progressing  1/4/2023 0733 by Brooklyn Servin RN  Outcome: Ongoing, Progressing

## 2023-01-04 NOTE — PROGRESS NOTES
"                           Progress Note    Chief Complaint:  COVID-19 virus infection     History of present illness:  Mr. Ramos is resting in bed comfortably this afternoon with two family member in the room.  Cardiology is called again due to occurrence of asymptomatic wide complex tachycardia earlier today.  The patient denies symptom of chest pain or shortness of breath.    ROS  Constitutional: Negative for activity change, appetite change, chills and diaphoresis.   HENT: Negative for congestion, dental problem, drooling and ear discharge.    Eyes: Negative for pain, discharge and itching.   Respiratory: Negative for apnea, choking and chest tightness.    Cardiovascular: Negative for chest pain.   Endocrine: Negative for cold intolerance and heat intolerance.   Genitourinary: Negative for difficulty urinating, dyspareunia and dysuria.   Allergic/Immunologic: Negative for environmental allergies and food allergies.   Neurological: Negative for seizures, facial asymmetry, light-headedness, numbness and headaches.   All other systems reviewed and are negative.    Scheduled Meds:   apixaban  5 mg Oral BID    pantoprazole  40 mg Intravenous Daily    perflutren lipid microspheres  1.3 mL Intravenous Once    polyethylene glycol  17 g Oral Daily    polyethylene glycol  17 g Oral QHS     Continuous Infusions:   dextrose 5 % and 0.45 % NaCl 100 mL/hr at 01/04/23 1441     PRN Meds:acetaminophen, benzonatate, bisacodyL, guaiFENesin-codeine 100-10 mg/5 ml, hydrOXYzine HCL, ondansetron    Objective:  Physical Exam:   BP (!) 144/83   Pulse 75   Temp 97.1 °F (36.2 °C) (Axillary)   Resp 20   Ht 6' 0.99" (1.854 m)   Wt 79.3 kg (174 lb 13.2 oz)   SpO2 (!) 90%   BMI 23.07 kg/m²     General Appearance:  Alert, cooperative, no distress, appears stated age   Head:  Normocephalic, without obvious abnormality, atraumatic   Eyes:  PERRL, conjunctiva/corneas clear, EOM's intact, fundi benign, both eyes   Ears:  Normal TM's and " external ear canals, both ears   Nose: Nares normal, septum midline, mucosa normal, no drainage or sinus tenderness   Throat: Lips, mucosa, and tongue normal; teeth and gums normal   Neck: Supple, symmetrical, trachea midline, no adenopathy, thyroid: not enlarged, symmetric, no tenderness/mass/nodules, no carotid bruit or JVD   Back:   Symmetric, no curvature, ROM normal, no CVA tenderness   Lungs:   Clear to auscultation bilaterally, respirations unlabored   Chest Wall:  No tenderness or deformity   Heart:  Regular rate and rhythm, S1, S2 normal, no murmur, rub or gallop   Abdomen:   Soft, non-tender, bowel sounds active all four quadrants,  no masses, no organomegaly           Extremities: Extremities normal, atraumatic, no cyanosis or edema   Pulses: 2+ and symmetric   Skin: Skin color, texture, turgor normal, no rashes or lesions   Lymph nodes: Cervical, supraclavicular, and axillary nodes normal   Neurologic: Normal         Cardiographics          Lab Review   Lab Results   Component Value Date     01/02/2023    K 3.2 (L) 01/02/2023    CO2 22 (L) 01/02/2023    BUN 16.5 01/02/2023    CREATININE 0.71 (L) 01/02/2023    GLUCOSE 92 01/02/2023    CALCIUM 8.2 (L) 01/02/2023     Lab Results   Component Value Date     01/02/2023     12/31/2022     12/30/2022    K 3.2 (L) 01/02/2023    K 3.7 12/31/2022    K 4.1 12/30/2022    CO2 22 (L) 01/02/2023    CO2 23 12/31/2022    CO2 22 (L) 12/30/2022    BUN 16.5 01/02/2023    BUN 24.8 12/31/2022    BUN 29.9 (H) 12/30/2022    CREATININE 0.71 (L) 01/02/2023    CREATININE 0.81 12/31/2022    CREATININE 0.92 12/30/2022    GLUCOSE 92 01/02/2023    GLUCOSE 126 (H) 12/31/2022    GLUCOSE 101 12/30/2022    CALCIUM 8.2 (L) 01/02/2023    CALCIUM 8.6 (L) 12/31/2022    CALCIUM 9.1 12/30/2022     Lab Results   Component Value Date    TROPONINI 0.162 (H) 12/31/2022       Problem/Plan    Patient Active Problem List   Diagnosis    A-fib    Mixed hyperlipidemia    Dysphagia     COVID-19 virus infection    Dementia       Assessment and Plan:      1. 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  There is no need for additional antiarrhythmic agent.  - continue Eliquis   -maintain electrolyte balance  -continue close telemetry monitoring    2. known CAD with marginally elevated troponin level with no chest pain  -likely Type II MI r/t demand ischemia with fall and COVID19 infection  -peak troponin 0.2 -> trended down to 0.17 and then to 0.162  -continue medical therapy      3. COVID19 infection  -per primary  -patient on remdesivir     4. HTN  -monitor BP closely  -adjust medical therapy as indicated     5. Lumbar fracture  -per primary and orthopedics     Cardiac status is stable and I have no further cardiac recommendation.  I will sign off again and please re-consult as needed.    Components of this note were documented using voice recognition systems; and are subject to errors not corrected at proof reading.  Please contact the author for any clarifications.     Juan Miguel Sharif  1/4/2023  5:12 PM

## 2023-01-04 NOTE — PT/OT/SLP PROGRESS
Physical Therapy Treatment    Patient Name:  Asael Ramos   MRN:  [id number]    Recommendations:     Discharge Recommendations: rehabilitation facility  Discharge Equipment Recommendations: walker, rolling  Barriers to discharge: None    Assessment:     Asael Ramos is a 82 y.o. male admitted with a medical diagnosis of COVID-19 virus infection.  He presents with the following impairments/functional limitations: gait instability, weakness, impaired endurance, impaired balance, decreased safety awareness, impaired functional mobility, impaired cognition .    Rehab Prognosis: Fair; patient would benefit from acute skilled PT services to address these deficits and reach maximum level of function.    Recent Surgery: * No surgery found *      Plan:     During this hospitalization, patient to be seen daily to address the identified rehab impairments via gait training, therapeutic activities and progress toward the following goals:    Plan of Care Expires:  01/29/23    Subjective     Chief Complaint:   Patient/Family Comments/goals:   Pain/Comfort:         Objective:     Communicated with nurse prior to session.  Patient found HOB elevated with peripheral IV, pulse ox (continuous), telemetry upon PT entry to room.     General Precautions: Standard, airborne, droplet, fall  Orthopedic Precautions: spinal precautions  Braces: LSO  Respiratory Status: Room air     Functional Mobility:  Bed Mobility:     Supine to Sit: moderate assistance  Sit to Supine: maximal assistance  Transfers:     Sit to Stand:  minimum assistance with hand-held assist  Gait: pt amb for approx 40ft with HHA and Pauly/modA for step pattern and directional change  Pt limited by fatigue.         Patient left HOB elevated with all lines intact and call button in reach..    GOALS:   Multidisciplinary Problems       Physical Therapy Goals          Problem: Physical Therapy    Goal Priority Disciplines Outcome Goal Variances Interventions   Physical  Therapy Goal     PT, PT/OT Ongoing, Progressing     Description: Goals to be met by: 23     Patient will increase functional independence with mobility by performin. Supine to sit with Stand-by Assistance  2. Sit to supine with Stand-by Assistance  3. Sit to stand transfer with Supervision  4. Gait  x 200 feet with Supervision using Rolling Walker.                          Time Tracking:     PT Received On: 23  PT Start Time: 1520     PT Stop Time: 1554  PT Total Time (min): 34 min     Billable Minutes: Gait Training 19 and Therapeutic Activity 15    Treatment Type: Treatment  PT/PTA: PTA     PTA Visit Number: 3     2023

## 2023-01-04 NOTE — PT/OT/SLP PROGRESS
"Occupational Therapy  Treatment    Asael Ramos   MRN: [id number]   Admitting Diagnosis: COVID-19 virus infection    OT Date of Treatment: 01/04/23   OT Start Time: 1016  OT Stop Time: 1054  OT Total Time (min): 38 min     Billable Minutes:  Self Care/Home Management 1 and Therapeutic Activity 2  Total Minutes: 38     OT/STIVEN: OT     STIVEN Visit Number: 1    General Precautions: Standard, fall, airborne, droplet  Orthopedic Precautions:    Braces: LSO         Subjective:  Pt's wife states pt did not sleep well last night.     Pain/Comfort  Pain Rating 1: 0/10    Objective:  Patient found with: peripheral IV, telemetry    Functional Mobility:  Bed Mobility:   Supine to sit: Total Assistance   Sit to supine: Total Assistance   Rolling: Activity did not occur   Scooting: Total Assistance to scoot EOB    UB Dressing:  - Dep to don LSO (Increased difficulty to don LSO properly. Wife states she thought his stomach looked more "bloated" than normal which could be a reason for decreased LSO fit. RN notified)    LE Dressing:  - Pt required max A to place LE's in partial figure 4 position. Pt demo decreased hip and knee flexibility and unable to maintain position for LB dressing. Pt's wife and daughter educated on use or reacher to increase ease of LB dressing while adhering to spinal precautions.         Balance:   Static Sit: Pt sat EOB ~10 min with max A.     Patient left supine with all lines intact, call button in reach, and wife and daughter present    ASSESSMENT:  Pt with poor tolerance and participation in OT session this visit. Pt required increased assist for bed mobil and static sitting balance this visit 2/2 pt lethargic with difficulty to maintain eyes open. SpO2 >93% and HR in the 80's throughout tx session. Pt with significant posterior and L lateral lean sitting EOB requiring max A for static sitting balance with max v/c's to maintain B LE contact with ground to improve stability. Recommend use of AE for LB " "dressing to adhere to spinal precautions d/t decreased LE flexibility to maintain figure 4 position. Will educate and have pt practice use of AE when more alert for LB dressing to improve IND with ADLs. RN notified of pt's lethargic status and wife's complaint of pt's "bloated" stomach.        GOALS:   Multidisciplinary Problems       Occupational Therapy Goals          Problem: Occupational Therapy    Goal Priority Disciplines Outcome Interventions   Occupational Therapy Goal     OT, PT/OT Ongoing, Progressing    Description: Goals to be met by: 1/17/2022     Patient will increase functional independence with ADLs by performing:    LE Dressing with Supervision.  Grooming while standing at sink with Supervision.  Toileting from toilet with Set-up Assistance for hygiene and clothing management.   Bathing from  shower chair/bench with Supervision.  Toilet transfer to toilet with Supervision.                                  01/04/2023  "

## 2023-01-05 ENCOUNTER — TELEPHONE (OUTPATIENT)
Dept: NEUROSURGERY | Facility: CLINIC | Age: 83
End: 2023-01-05
Payer: MEDICARE

## 2023-01-05 VITALS
HEART RATE: 89 BPM | HEIGHT: 73 IN | TEMPERATURE: 98 F | WEIGHT: 174.81 LBS | DIASTOLIC BLOOD PRESSURE: 78 MMHG | RESPIRATION RATE: 20 BRPM | OXYGEN SATURATION: 95 % | BODY MASS INDEX: 23.17 KG/M2 | SYSTOLIC BLOOD PRESSURE: 123 MMHG

## 2023-01-05 LAB
ALBUMIN SERPL-MCNC: 2.9 G/DL (ref 3.4–4.8)
ALBUMIN/GLOB SERPL: 1.3 RATIO (ref 1.1–2)
ALP SERPL-CCNC: 94 UNIT/L (ref 40–150)
ALT SERPL-CCNC: 33 UNIT/L (ref 0–55)
AST SERPL-CCNC: 36 UNIT/L (ref 5–34)
BASOPHILS # BLD AUTO: 0.02 X10(3)/MCL (ref 0–0.2)
BASOPHILS NFR BLD AUTO: 0.3 %
BILIRUBIN DIRECT+TOT PNL SERPL-MCNC: 3.3 MG/DL
BUN SERPL-MCNC: 15.1 MG/DL (ref 8.4–25.7)
CALCIUM SERPL-MCNC: 8.2 MG/DL (ref 8.8–10)
CHLORIDE SERPL-SCNC: 109 MMOL/L (ref 98–107)
CO2 SERPL-SCNC: 23 MMOL/L (ref 23–31)
CREAT SERPL-MCNC: 0.78 MG/DL (ref 0.73–1.18)
EOSINOPHIL # BLD AUTO: 0.08 X10(3)/MCL (ref 0–0.9)
EOSINOPHIL NFR BLD AUTO: 1.2 %
ERYTHROCYTE [DISTWIDTH] IN BLOOD BY AUTOMATED COUNT: 14.7 % (ref 11.6–14.4)
GFR SERPLBLD CREATININE-BSD FMLA CKD-EPI: 88 MLS/MIN/1.73/M2
GLOBULIN SER-MCNC: 2.2 GM/DL (ref 2.4–3.5)
GLUCOSE SERPL-MCNC: 110 MG/DL (ref 82–115)
HCT VFR BLD AUTO: 37 % (ref 42–52)
HGB BLD-MCNC: 12.4 GM/DL (ref 14–18)
IMM GRANULOCYTES # BLD AUTO: 0.04 X10(3)/MCL (ref 0–0.04)
IMM GRANULOCYTES NFR BLD AUTO: 0.6 %
LYMPHOCYTES # BLD AUTO: 0.65 X10(3)/MCL (ref 0.6–4.6)
LYMPHOCYTES NFR BLD AUTO: 10.1 %
MAGNESIUM SERPL-MCNC: 1.9 MG/DL (ref 1.6–2.6)
MCH RBC QN AUTO: 31.3 PG
MCHC RBC AUTO-ENTMCNC: 33.5 MG/DL (ref 33–36)
MCV RBC AUTO: 93.4 FL (ref 80–94)
MONOCYTES # BLD AUTO: 0.54 X10(3)/MCL (ref 0.1–1.3)
MONOCYTES NFR BLD AUTO: 8.4 %
NEUTROPHILS # BLD AUTO: 5.1 X10(3)/MCL (ref 2.1–9.2)
NEUTROPHILS NFR BLD AUTO: 79.4 %
NRBC BLD AUTO-RTO: 0 % (ref 0–1)
PLATELET # BLD AUTO: 127 X10(3)/MCL (ref 140–371)
PMV BLD AUTO: 10.3 FL (ref 9.4–12.4)
POTASSIUM SERPL-SCNC: 3.4 MMOL/L (ref 3.5–5.1)
PROT SERPL-MCNC: 5.1 GM/DL (ref 5.8–7.6)
RBC # BLD AUTO: 3.96 X10(6)/MCL (ref 4.7–6.1)
SODIUM SERPL-SCNC: 140 MMOL/L (ref 136–145)
WBC # SPEC AUTO: 6.4 X10(3)/MCL (ref 4.5–11.5)

## 2023-01-05 PROCEDURE — 97530 THERAPEUTIC ACTIVITIES: CPT | Mod: CQ

## 2023-01-05 PROCEDURE — S5010 5% DEXTROSE AND 0.45% SALINE: HCPCS | Performed by: STUDENT IN AN ORGANIZED HEALTH CARE EDUCATION/TRAINING PROGRAM

## 2023-01-05 PROCEDURE — 25000003 PHARM REV CODE 250: Performed by: STUDENT IN AN ORGANIZED HEALTH CARE EDUCATION/TRAINING PROGRAM

## 2023-01-05 PROCEDURE — 97116 GAIT TRAINING THERAPY: CPT | Mod: CQ

## 2023-01-05 PROCEDURE — 85025 COMPLETE CBC W/AUTO DIFF WBC: CPT | Performed by: INTERNAL MEDICINE

## 2023-01-05 PROCEDURE — 1111F DSCHRG MED/CURRENT MED MERGE: CPT | Mod: CPTII,,, | Performed by: INTERNAL MEDICINE

## 2023-01-05 PROCEDURE — 1111F PR DISCHARGE MEDS RECONCILED W/ CURRENT OUTPATIENT MED LIST: ICD-10-PCS | Mod: CPTII,,, | Performed by: INTERNAL MEDICINE

## 2023-01-05 PROCEDURE — 83735 ASSAY OF MAGNESIUM: CPT | Performed by: INTERNAL MEDICINE

## 2023-01-05 PROCEDURE — 97535 SELF CARE MNGMENT TRAINING: CPT

## 2023-01-05 PROCEDURE — 80053 COMPREHEN METABOLIC PANEL: CPT | Performed by: INTERNAL MEDICINE

## 2023-01-05 PROCEDURE — 25000003 PHARM REV CODE 250: Performed by: INTERNAL MEDICINE

## 2023-01-05 PROCEDURE — 99239 PR HOSPITAL DISCHARGE DAY,>30 MIN: ICD-10-PCS | Mod: ,,, | Performed by: INTERNAL MEDICINE

## 2023-01-05 PROCEDURE — 99239 HOSP IP/OBS DSCHRG MGMT >30: CPT | Mod: ,,, | Performed by: INTERNAL MEDICINE

## 2023-01-05 PROCEDURE — 36415 COLL VENOUS BLD VENIPUNCTURE: CPT | Performed by: INTERNAL MEDICINE

## 2023-01-05 RX ORDER — POTASSIUM CHLORIDE 20 MEQ/1
20 TABLET, EXTENDED RELEASE ORAL ONCE
Status: COMPLETED | OUTPATIENT
Start: 2023-01-05 | End: 2023-01-05

## 2023-01-05 RX ORDER — CODEINE PHOSPHATE AND GUAIFENESIN 10; 100 MG/5ML; MG/5ML
5 SOLUTION ORAL EVERY 8 HOURS PRN
Qty: 75 ML | Refills: 0 | Status: SHIPPED | OUTPATIENT
Start: 2023-01-05 | End: 2023-01-15

## 2023-01-05 RX ADMIN — POTASSIUM CHLORIDE 20 MEQ: 1500 TABLET, EXTENDED RELEASE ORAL at 11:01

## 2023-01-05 RX ADMIN — DEXTROSE AND SODIUM CHLORIDE: 5; 450 INJECTION, SOLUTION INTRAVENOUS at 01:01

## 2023-01-05 RX ADMIN — ACETAMINOPHEN 650 MG: 325 TABLET ORAL at 11:01

## 2023-01-05 RX ADMIN — APIXABAN 2.5 MG: 2.5 TABLET, FILM COATED ORAL at 11:01

## 2023-01-05 NOTE — PROGRESS NOTES
01/05/23 1214   Final Note   Assessment Type Final Discharge Note   Anticipated Discharge Disposition Home-Health   Post-Acute Status   Post-Acute Authorization Home Health   Post-Acute Placement Status Discharge Plan Changed   HME Status Set-up Complete/Auth obtained   Home Health Status Referrals Sent   Discharge Delays None known at this time     Verbal FOC obtained for [facility]. Patients wife states they have [facility] lined up as sitter service and will meet them at home after dc. Referral sent to [facility] for WC & notified of DC for walker delivery. Family will transport home.

## 2023-01-05 NOTE — DISCHARGE SUMMARY
[facility] Hospital Medicine Discharge Summary    Admit Date: 12/28/2022  Discharge Date and Time: 1/5/20234:59 PM  Admitting Physician:  Team  Discharging Physician: Aline Montoya MD.  Primary Care Physician: Aline Montoya MD  Consults: None    Discharge Diagnoses:  Physical deconditioning from COVID 19 Pneumonia    Hospital Course:   Mr. Ramos is a 82-year-old man that presented with 4 days complaints of fever with physical deconditioning and increasing severe low back pain.  Patient was noted to have an L1 compression fracture upon admission as well as noted to have COVID-19 respiratory tract infection.  Received some morphine which aggravated his cough as well as his somnolence behavior and confusion.  He did receive a dose of Narcan during the hospital stay with improvement in his mental status.  Had some issues with physical deconditioning and needed physical, occupational and speech therapy during the stay.  Recommendations were made to possibly look for rehab placement   Family however reluctant for patient to go to rehab since they would not like patient to be in the facility by himself.  Because of his dementia it usually gets worse when he is left alone.  Wife was made to understand that rehab does not allow or make exceptions for family members to spend the night.  Also emphasized on the importance of why patient needs rehab.    Insurance has currently denied patient rehab however Case Management was trying to get a peer to peer set up however considering the fact that family members do not want to take him to rehab to begin with, Case Management will start looking at other safe discharge options.   The rehab is highly recommended, family members vehemently wanting to take patient home.  They are going to make arrangements for 24 hour sitters and home health services will be set up.  All DME equipment that will be necessary is set up per case  management.  Patient currently remains with a Tiwari catheter however if he is  successful with a voiding trial, can possibly be discharged home with home health.  Wife understands that his condition is poor and he will need a lot of caregiver help.    Pt was seen and examined on the day of discharge  Vitals:  VITAL SIGNS: 24 HRS MIN & MAX LAST   Temp  Min: 97.6 °F (36.4 °C)  Max: 99.2 °F (37.3 °C) 99.1 °F (37.3 °C)   BP  Min: 127/87  Max: 142/96 (!) 136/92     Pulse  Min: 81  Max: 106  86   Resp  Min: 18  Max: 18 18   SpO2  Min: 93 %  Max: 95 % (!) 94 %         Physical Exam:  General: In no acute distress, afebrile  Chest: Clear to auscultation bilaterally  Heart: RRR, +S1, S2, no appreciable murmur  Abdomen: Soft, nontender, BS +  MSK: Warm, no lower extremity edema, no clubbing or cyanosis  Neurologic:  Somewhat sleepy however no acute distress, appears Deconditioned     Procedures Performed: No admission procedures for hospital encounter.     Significant Diagnostic Studies: See Full reports for all details    Recent Labs   Lab 12/30/22 0444 12/31/22 0416 01/05/23  0118   WBC 8.7 6.4 6.4   RBC 4.33* 4.14* 3.96*   HGB 13.6* 13.3* 12.4*   HCT 40.6* 39.1* 37.0*   MCV 93.8 94.4* 93.4   MCH 31.4 32.1 31.3   MCHC 33.5 34.0 33.5   RDW 15.3* 15.4* 14.7*   PLT 86* 169 127*   MPV 10.9 11.6 10.3       Recent Labs   Lab 12/30/22 0444 12/31/22  0416 01/02/23  0559 01/05/23  0118    144 141 140   K 4.1 3.7 3.2* 3.4*   CO2 22* 23 22* 23   BUN 29.9* 24.8 16.5 15.1   CREATININE 0.92 0.81 0.71* 0.78   CALCIUM 9.1 8.6* 8.2* 8.2*   MG  --   --  1.80 1.90   ALBUMIN 3.2*  --  3.0* 2.9*   ALKPHOS 89  --  90 94   ALT 55  --  41 33   *  --  53* 36*   BILITOT 1.1  --  2.7* 3.3*        Microbiology Results (last 7 days)       ** No results found for the last 168 hours. **             Fl Modified Barium Swallow Speech  See procedure notes from Speech Pathologist.    This procedure was auto-finalized.         Medication List         START taking these medications      guaiFENesin-codeine 100-10 mg/5 ml  mg/5 mL syrup  Commonly known as: TUSSI-ORGANIDIN NR  Take 5 mLs by mouth every 8 (eight) hours as needed for Cough or Congestion.            CHANGE how you take these medications      apixaban 2.5 mg Tab  Commonly known as: ELIQUIS  Take 1 tablet (2.5 mg total) by mouth 2 (two) times a day.  What changed:   medication strength  how much to take            CONTINUE taking these medications      cholecalciferol (vitamin D3) 25 mcg (1,000 unit) capsule  Commonly known as: VITAMIN D3     fish oil-omega-3 fatty acids 300-1,000 mg capsule     hydrOXYzine HCL 10 MG Tab  Commonly known as: ATARAX  Take 1 tablet (10 mg total) by mouth 3 (three) times daily as needed (itching).     magnesium oxide 500 mg Tab     multivitamin per tablet  Commonly known as: THERAGRAN               Where to Get Your Medications        These medications were sent to [id number] [place], [place] - [address]   [address] [address]      Phone: [phone]   guaiFENesin-codeine 100-10 mg/5 ml  mg/5 mL syrup       Information about where to get these medications is not yet available    Ask your nurse or doctor about these medications  apixaban 2.5 mg Tab          Explained in detail to the patient about the discharge plan, medications, and follow-up visits. Pt understands and agrees with the treatment plan  Discharge Disposition:     Discharged Condition: stable  Diet-   Dietary Orders (From admission, onward)       Start     Ordered    01/02/23 1454  Diet Minced & Moist  Diet effective now        Comments: Miced and moist soilds, thin liquids    01/02/23 1459                   Medications Per DC med rec  Activities as tolerated   Contact information for follow-up providers       Cheikh Pettit MD. Call.    Specialty: Neurosurgery  Why: F/u in 3-4 weeks with repeat xr    Dr office  will call for F/U visit  Contact information:  [address] [place] [address]   [address]  [facility] [address] [phone]                       Contact information for after-discharge care       Durable Medical Equipment       [facility]    Service: Durable Medical Equipment  Contact information:  [address]  [address]  [facility] [id number] [phone]                     Home Medical Care       [facility]    Service: Home Health Services  Contact information:  [address]  [facility] [address] [phone]                                 For further questions contact  Dr Montoya's office    Discharge time 33 minutes    For worsening symptoms, chest pain, shortness of breath, increased abdominal pain, high grade fever, stroke or stroke like symptoms, immediately go to the nearest Emergency Room or call 911 as soon as possible.      Aline Washington M.D on 1/5/2023. at 4:59 PM.

## 2023-01-05 NOTE — PT/OT/SLP PROGRESS
Physical Therapy Treatment    Patient Name:  Asael Ramos   MRN:  [id number]    Recommendations:     Discharge Recommendations: rehabilitation facility  Discharge Equipment Recommendations: walker, rolling  Barriers to discharge: None    Assessment:     Asael Ramos is a 82 y.o. male admitted with a medical diagnosis of COVID-19 virus infection.  He presents with the following impairments/functional limitations: weakness, gait instability, impaired endurance, impaired balance, decreased safety awareness, impaired functional mobility, impaired cognition .    Rehab Prognosis: Fair; patient would benefit from acute skilled PT services to address these deficits and reach maximum level of function.    Recent Surgery: * No surgery found *      Plan:     During this hospitalization, patient to be seen daily to address the identified rehab impairments via gait training, therapeutic activities and progress toward the following goals:    Plan of Care Expires:  01/29/23    Subjective     Chief Complaint:   Patient/Family Comments/goals:   Pain/Comfort:         Objective:     Communicated with nurse prior to session.  Patient found HOB elevated with pulse ox (continuous), telemetry, peripheral IV upon PT entry to room.     General Precautions: Standard, airborne, droplet, fall  Orthopedic Precautions: spinal precautions  Braces: LSO  Respiratory Status: Room air     Functional Mobility:  Bed Mobility:     Supine to Sit: maximal assistance  Transfers:     Sit to Stand:  moderate assistance with rolling walker  Gait: pt amb x20ft with HHA and noted trunk flexion as well as shufffling gait. Pt required max verbal and visual cuing for turns and sequencing.       AM-PAC 6 CLICK MOBILITY          Treatment & Education:  Extensive time spent educating spouse and son on transfers, cuing and safety belt placement as well as donning LSO. Pt with decreased command following and fatigue noted.     Patient left up in chair with all  lines intact and call button in reach..    GOALS:   Multidisciplinary Problems       Physical Therapy Goals          Problem: Physical Therapy    Goal Priority Disciplines Outcome Goal Variances Interventions   Physical Therapy Goal     PT, PT/OT Ongoing, Progressing     Description: Goals to be met by: 23     Patient will increase functional independence with mobility by performin. Supine to sit with Stand-by Assistance  2. Sit to supine with Stand-by Assistance  3. Sit to stand transfer with Supervision  4. Gait  x 200 feet with Supervision using Rolling Walker.                          Time Tracking:     PT Received On: 23  PT Start Time: 1351     PT Stop Time: 1449  PT Total Time (min): 58 min     Billable Minutes: Gait Training 8 and Therapeutic Activity 50    Treatment Type: Treatment  PT/PTA: PTA     PTA Visit Number: 4     2023

## 2023-01-05 NOTE — PT/OT/SLP PROGRESS
Occupational Therapy   Treatment    Name: Asael Ramos  MRN: [id number]  Admitting Diagnosis:  COVID-19 virus infection       Recommendations:     Discharge Recommendations: rehabilitation facility  Discharge Equipment Recommendations:  walker, rolling, bedside commode, wheelchair, shower chair  Barriers to discharge:  None    Assessment:     Asael Ramos is a 82 y.o. male with a medical diagnosis of L1 compression fracture following a fall and COVID-19 virus infection.  He presents with fatigue/lethargy during session today. Performance deficits affecting function are weakness, gait instability, impaired endurance, impaired balance, impaired cognition, impaired self care skills, impaired functional mobility, decreased safety awareness.     Rehab Prognosis:  Good; patient would benefit from acute skilled OT services to address these deficits and reach maximum level of function.       Plan:     Patient to be seen 5 x/week, daily to address the above listed problems via self-care/home management, therapeutic activities  Plan of Care Expires: 01/17/23  Plan of Care Reviewed with: patient, spouse, son    Subjective     Pain/Comfort:  Pain Rating 1: 0/10    Objective:     Communicated with: nrsg prior to session.  Patient found HOB elevated with telemetry, pulse ox (continuous), peripheral IV upon OT entry to room.    General Precautions: Standard, fall, droplet, contact    Orthopedic Precautions:spinal precautions  Braces: LSO  Respiratory Status: Room air     Occupational Performance:     Bed Mobility:    Patient completed Supine to Sit with maximal assistance.    Functional Mobility/Transfers:  Patient completed Sit <> Stand Transfer with moderate assistance and of 2 persons  with  rolling walker   Patient completed Bed <> Chair Transfer using Step Transfer technique with moderate assistance and of 2 persons with rolling walker  Functional Mobility: Pt ambulated 5 ft to bedside chair.       Treatment &  Education:  OT recommending inpatient rehabilitation as safest option; however, family would prefer to take patient home. Extensive time spent in room for family education and training to prepare patient for safest possible return home, including transfer training and equipment recommendation.     Patient left up in chair with all lines intact, call button in reach, and wife present    GOALS:   Multidisciplinary Problems       Occupational Therapy Goals          Problem: Occupational Therapy    Goal Priority Disciplines Outcome Interventions   Occupational Therapy Goal     OT, PT/OT Ongoing, Progressing    Description: Goals to be met by: 1/17/2022     Patient will increase functional independence with ADLs by performing:    LE Dressing with Supervision.  Grooming while standing at sink with Supervision.  Toileting from toilet with Set-up Assistance for hygiene and clothing management.   Bathing from  shower chair/bench with Supervision.  Toilet transfer to toilet with Supervision.                         Time Tracking:     OT Date of Treatment: 01/05/23  OT Start Time: 1351  OT Stop Time: 1449  OT Total Time (min): 58 min    Billable Minutes:Self Care/Home Management 58 min    OT/STIVEN: OT     STIVEN Visit Number: 2    1/5/2023

## 2023-01-06 ENCOUNTER — PATIENT OUTREACH (OUTPATIENT)
Dept: ADMINISTRATIVE | Facility: CLINIC | Age: 83
End: 2023-01-06
Payer: MEDICARE

## 2023-01-06 PROCEDURE — G0180 MD CERTIFICATION HHA PATIENT: HCPCS | Mod: ,,, | Performed by: INTERNAL MEDICINE

## 2023-01-06 PROCEDURE — G0180 PR HOME HEALTH MD CERTIFICATION: ICD-10-PCS | Mod: ,,, | Performed by: INTERNAL MEDICINE

## 2023-01-06 NOTE — PROGRESS NOTES
C3 nurse attempted to contact Asael Ramos for a TCC post hospital discharge follow up call. No answer, left VM, CB# provided.  The patient does not have a scheduled HOSFU appointment that I can locate, does have an appt scheduled for a wellness visit 2/23/23, msg routed to Aline Montoya MD staff to schedule HOSFU within 5-7 days of discharge.

## 2023-01-06 NOTE — PROGRESS NOTES
"                           Progress Note    Chief Complaint:  COVID-19 virus infection     History of present illness:  Mr. Ramos is resting in bed comfortably this morning, with his wife in the room.  He remained in atrial fibrillation with controlled ventricular rate and with occasional aberrant conduction.    ROS  Constitutional: Negative for activity change, appetite change, chills and diaphoresis.   HENT: Negative for congestion, dental problem, drooling and ear discharge.    Eyes: Negative for pain, discharge and itching.   Respiratory: Negative for apnea, choking and chest tightness.    Cardiovascular: Negative for chest pain.   Endocrine: Negative for cold intolerance and heat intolerance.   Genitourinary: Negative for difficulty urinating, dyspareunia and dysuria.   Allergic/Immunologic: Negative for environmental allergies and food allergies.   Neurological: Negative for seizures, facial asymmetry, light-headedness, numbness and headaches.   All other systems reviewed and are negative.    Scheduled Meds:  Continuous Infusions:  PRN Meds:    Objective:  Physical Exam:   /78   Pulse 89   Temp 97.5 °F (36.4 °C) (Axillary)   Resp 20   Ht 6' 0.99" (1.854 m)   Wt 79.3 kg (174 lb 13.2 oz)   SpO2 95%   BMI 23.07 kg/m²     General Appearance:  Alert, cooperative, no distress, appears stated age   Head:  Normocephalic, without obvious abnormality, atraumatic   Eyes:  PERRL, conjunctiva/corneas clear, EOM's intact, fundi benign, both eyes   Ears:  Normal TM's and external ear canals, both ears   Nose: Nares normal, septum midline, mucosa normal, no drainage or sinus tenderness   Throat: Lips, mucosa, and tongue normal; teeth and gums normal   Neck: Supple, symmetrical, trachea midline, no adenopathy, thyroid: not enlarged, symmetric, no tenderness/mass/nodules, no carotid bruit or JVD   Back:   Symmetric, no curvature, ROM normal, no CVA tenderness   Lungs:   Clear to auscultation bilaterally, " respirations unlabored   Chest Wall:  No tenderness or deformity   Heart:  Regular rate and rhythm, S1, S2 normal, no murmur, rub or gallop   Abdomen:   Soft, non-tender, bowel sounds active all four quadrants,  no masses, no organomegaly           Extremities: Extremities normal, atraumatic, no cyanosis or edema   Pulses: 2+ and symmetric   Skin: Skin color, texture, turgor normal, no rashes or lesions   Lymph nodes: Cervical, supraclavicular, and axillary nodes normal   Neurologic: Normal         Cardiographics          Lab Review   Lab Results   Component Value Date     01/05/2023    K 3.4 (L) 01/05/2023    CO2 23 01/05/2023    BUN 15.1 01/05/2023    CREATININE 0.78 01/05/2023    GLUCOSE 110 01/05/2023    CALCIUM 8.2 (L) 01/05/2023     Lab Results   Component Value Date     01/05/2023     01/02/2023     12/31/2022    K 3.4 (L) 01/05/2023    K 3.2 (L) 01/02/2023    K 3.7 12/31/2022    CO2 23 01/05/2023    CO2 22 (L) 01/02/2023    CO2 23 12/31/2022    BUN 15.1 01/05/2023    BUN 16.5 01/02/2023    BUN 24.8 12/31/2022    CREATININE 0.78 01/05/2023    CREATININE 0.71 (L) 01/02/2023    CREATININE 0.81 12/31/2022    GLUCOSE 110 01/05/2023    GLUCOSE 92 01/02/2023    GLUCOSE 126 (H) 12/31/2022    CALCIUM 8.2 (L) 01/05/2023    CALCIUM 8.2 (L) 01/02/2023    CALCIUM 8.6 (L) 12/31/2022     Lab Results   Component Value Date    TROPONINI 0.162 (H) 12/31/2022       Problem/Plan    Patient Active Problem List   Diagnosis    A-fib    Mixed hyperlipidemia    Dysphagia    COVID-19 virus infection    Dementia       Assessment and Plan:      1. 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  There is no need for additional antiarrhythmic agent.  - continue Eliquis   -maintain electrolyte balance  -continue close telemetry monitoring     2. known CAD with marginally elevated troponin level with no chest pain  -likely Type II MI r/t  demand ischemia with fall and COVID19 infection  -peak troponin 0.2 -> trended down to 0.17 and then to 0.162  -continue medical therapy      3. COVID19 infection  -per primary  -patient on remdesivir     4. HTN  -monitor BP closely  -adjust medical therapy as indicated     5. Lumbar fracture  -per primary and orthopedics      Cardiac status is stable , and he can be discharged from a cardiac standpoint.  I will follow the patient in my office.    Components of this note were documented using voice recognition systems; and are subject to errors not corrected at proof reading.  Please contact the author for any clarifications.     Juan Miguel Sharif  1/5/2023  7:25 a.m.

## 2023-01-10 NOTE — PROGRESS NOTES
C3 nurse spoke with Asael Ramos   for a TCC post hospital discharge follow up call. The patient has a scheduled [facility] appointment with Aline Montoya MD   on 02/23/2023 @ 4PM. He is not taking his vitamins at this time he will resume taking them

## 2023-01-11 DIAGNOSIS — S32.010A CLOSED COMPRESSION FRACTURE OF BODY OF L1 VERTEBRA: Primary | ICD-10-CM

## 2023-01-11 NOTE — TELEPHONE ENCOUNTER
I spoke to the patients wife Perla.  I scheduled him for 1/24/23 with Sariah and JOANNE prior.  She asked to call me back later today to get details and address...BH

## 2023-01-12 ENCOUNTER — LAB REQUISITION (OUTPATIENT)
Dept: LAB | Facility: HOSPITAL | Age: 83
End: 2023-01-12
Attending: INTERNAL MEDICINE
Payer: MEDICARE

## 2023-01-12 DIAGNOSIS — N39.0 URINARY TRACT INFECTION, SITE NOT SPECIFIED: ICD-10-CM

## 2023-01-12 LAB
APPEARANCE UR: CLEAR
BILIRUB UR QL STRIP.AUTO: NEGATIVE MG/DL
COLOR UR AUTO: YELLOW
GLUCOSE UR QL STRIP.AUTO: NEGATIVE MG/DL
KETONES UR QL STRIP.AUTO: NEGATIVE MG/DL
LEUKOCYTE ESTERASE UR QL STRIP.AUTO: NEGATIVE UNIT/L
NITRITE UR QL STRIP.AUTO: NEGATIVE
PH UR STRIP.AUTO: 6.5 [PH]
PROT UR QL STRIP.AUTO: NEGATIVE MG/DL
RBC UR QL AUTO: NEGATIVE UNIT/L
SP GR UR STRIP.AUTO: 1.02
UROBILINOGEN UR STRIP-ACNC: 2 MG/DL

## 2023-01-12 PROCEDURE — 81003 URINALYSIS AUTO W/O SCOPE: CPT | Performed by: INTERNAL MEDICINE

## 2023-01-24 ENCOUNTER — OFFICE VISIT (OUTPATIENT)
Dept: NEUROSURGERY | Facility: CLINIC | Age: 83
End: 2023-01-24
Payer: MEDICARE

## 2023-01-24 ENCOUNTER — HOSPITAL ENCOUNTER (OUTPATIENT)
Dept: RADIOLOGY | Facility: HOSPITAL | Age: 83
Discharge: HOME | End: 2023-01-24
Attending: NEUROLOGICAL SURGERY
Payer: MEDICARE

## 2023-01-24 VITALS
WEIGHT: 186 LBS | BODY MASS INDEX: 25.19 KG/M2 | HEART RATE: 104 BPM | DIASTOLIC BLOOD PRESSURE: 68 MMHG | RESPIRATION RATE: 20 BRPM | HEIGHT: 72 IN | SYSTOLIC BLOOD PRESSURE: 126 MMHG

## 2023-01-24 DIAGNOSIS — S32.010A CLOSED COMPRESSION FRACTURE OF BODY OF L1 VERTEBRA: Primary | ICD-10-CM

## 2023-01-24 DIAGNOSIS — S32.010A CLOSED COMPRESSION FRACTURE OF BODY OF L1 VERTEBRA: ICD-10-CM

## 2023-01-24 PROCEDURE — 1159F PR MEDICATION LIST DOCUMENTED IN MEDICAL RECORD: ICD-10-PCS | Mod: CPTII,,, | Performed by: PHYSICIAN ASSISTANT

## 2023-01-24 PROCEDURE — 1101F PR PT FALLS ASSESS DOC 0-1 FALLS W/OUT INJ PAST YR: ICD-10-PCS | Mod: CPTII,,, | Performed by: PHYSICIAN ASSISTANT

## 2023-01-24 PROCEDURE — 3078F PR MOST RECENT DIASTOLIC BLOOD PRESSURE < 80 MM HG: ICD-10-PCS | Mod: CPTII,,, | Performed by: PHYSICIAN ASSISTANT

## 2023-01-24 PROCEDURE — 1125F PR PAIN SEVERITY QUANTIFIED, PAIN PRESENT: ICD-10-PCS | Mod: CPTII,,, | Performed by: PHYSICIAN ASSISTANT

## 2023-01-24 PROCEDURE — 72100 X-RAY EXAM L-S SPINE 2/3 VWS: CPT | Mod: TC

## 2023-01-24 PROCEDURE — 1160F PR REVIEW ALL MEDS BY PRESCRIBER/CLIN PHARMACIST DOCUMENTED: ICD-10-PCS | Mod: CPTII,,, | Performed by: PHYSICIAN ASSISTANT

## 2023-01-24 PROCEDURE — 1101F PT FALLS ASSESS-DOCD LE1/YR: CPT | Mod: CPTII,,, | Performed by: PHYSICIAN ASSISTANT

## 2023-01-24 PROCEDURE — 99213 PR OFFICE/OUTPT VISIT, EST, LEVL III, 20-29 MIN: ICD-10-PCS | Mod: ,,, | Performed by: PHYSICIAN ASSISTANT

## 2023-01-24 PROCEDURE — 1159F MED LIST DOCD IN RCRD: CPT | Mod: CPTII,,, | Performed by: PHYSICIAN ASSISTANT

## 2023-01-24 PROCEDURE — 1125F AMNT PAIN NOTED PAIN PRSNT: CPT | Mod: CPTII,,, | Performed by: PHYSICIAN ASSISTANT

## 2023-01-24 PROCEDURE — 3074F SYST BP LT 130 MM HG: CPT | Mod: CPTII,,, | Performed by: PHYSICIAN ASSISTANT

## 2023-01-24 PROCEDURE — 3288F PR FALLS RISK ASSESSMENT DOCUMENTED: ICD-10-PCS | Mod: CPTII,,, | Performed by: PHYSICIAN ASSISTANT

## 2023-01-24 PROCEDURE — 1160F RVW MEDS BY RX/DR IN RCRD: CPT | Mod: CPTII,,, | Performed by: PHYSICIAN ASSISTANT

## 2023-01-24 PROCEDURE — 3288F FALL RISK ASSESSMENT DOCD: CPT | Mod: CPTII,,, | Performed by: PHYSICIAN ASSISTANT

## 2023-01-24 PROCEDURE — 3074F PR MOST RECENT SYSTOLIC BLOOD PRESSURE < 130 MM HG: ICD-10-PCS | Mod: CPTII,,, | Performed by: PHYSICIAN ASSISTANT

## 2023-01-24 PROCEDURE — 3078F DIAST BP <80 MM HG: CPT | Mod: CPTII,,, | Performed by: PHYSICIAN ASSISTANT

## 2023-01-24 PROCEDURE — 99213 OFFICE O/P EST LOW 20 MIN: CPT | Mod: ,,, | Performed by: PHYSICIAN ASSISTANT

## 2023-01-24 RX ORDER — ACETAMINOPHEN 500 MG
500 TABLET ORAL EVERY 6 HOURS PRN
COMMUNITY

## 2023-01-24 RX ORDER — ALBUTEROL SULFATE 0.83 MG/ML
SOLUTION RESPIRATORY (INHALATION)
COMMUNITY
Start: 2023-01-07 | End: 2023-06-21

## 2023-01-24 NOTE — PROGRESS NOTES
[facility]  History & Physical  Neurosurgery      Asael Ramos   [id number]   1940       CHIEF COMPLAINT:  Lower back pain    HPI:  Asael Ramos is a 82 y.o. male who presents for follow up appointment.  The patient was initially seen by Dr. Pettit on 12/29/2022.  He was admitted on 12/28/2022 with COVID.  He was symptomatic 3-4 days prior to his admission.  Two days prior, he fell while walking to the restroom.  He was found to have an L1 compression fracture.  He was treated conservatively with LSO brace.  He continues with pain rated at a 5/10.  Cognitively, he has not been as himself.  This is likely secondary to COVID.  He presents today with x-rays.  There is no pain, numbness, or tingling in either lower extremity.      Past Medical History:   Diagnosis Date    A-fib     Brain concussion     CAD (coronary artery disease)     Malignant melanoma of skin, unspecified     Mild cognitive impairment     Mixed hyperlipidemia     Pulmonary hypertension     Shingles     Sleep apnea, unspecified        Past Surgical History:   Procedure Laterality Date    CATARACT EXTRACTION      CORONARY STENT PLACEMENT      HIP REPLACEMENT ARTHROPLASTY      TOTAL SHOULDER ARTHROPLASTY Bilateral        Family History   Problem Relation Age of Onset    Breast cancer Mother     Cancer Brother        Social History     Socioeconomic History    Marital status:    Tobacco Use    Smoking status: Never    Smokeless tobacco: Never   Substance and Sexual Activity    Alcohol use: Yes     Alcohol/week: 2.0 standard drinks     Types: 1 Glasses of wine, 1 Cans of beer per week       Current Outpatient Medications   Medication Sig Dispense Refill    albuterol (PROVENTIL) 2.5 mg /3 mL (0.083 %) nebulizer solution INHALE ONE VIAL VIA NEBULIZER FOUR TIMES DAILY AS NEEDED FOR WHEEZING      apixaban (ELIQUIS) 2.5 mg Tab Take 1 tablet (2.5 mg total) by mouth 2 (two) times a day.      magnesium oxide 500 mg Tab Take 1  tablet by mouth once daily.      multivitamin (THERAGRAN) per tablet Take 1 tablet by mouth once daily.      omega-3 fatty acids/fish oil (FISH OIL-OMEGA-3 FATTY ACIDS) 300-1,000 mg capsule Take 1 g by mouth Daily.      acetaminophen (TYLENOL EXTRA STRENGTH) 500 MG tablet Take 500 mg by mouth every 6 (six) hours as needed for Pain.      cholecalciferol, vitamin D3, (VITAMIN D3) 25 mcg (1,000 unit) capsule Take 1,000 Units by mouth Daily.      hydrOXYzine HCL (ATARAX) 10 MG Tab Take 1 tablet (10 mg total) by mouth 3 (three) times daily as needed (itching). (Patient not taking: Reported on 1/10/2023) 90 tablet 5     No current facility-administered medications for this visit.       Review of patient's allergies indicates:  No Known Allergies     Medication List with Changes/Refills   Current Medications    ACETAMINOPHEN (TYLENOL EXTRA STRENGTH) 500 MG TABLET    Take 500 mg by mouth every 6 (six) hours as needed for Pain.    ALBUTEROL (PROVENTIL) 2.5 MG /3 ML (0.083 %) NEBULIZER SOLUTION    INHALE ONE VIAL VIA NEBULIZER FOUR TIMES DAILY AS NEEDED FOR WHEEZING    APIXABAN (ELIQUIS) 2.5 MG TAB    Take 1 tablet (2.5 mg total) by mouth 2 (two) times a day.    CHOLECALCIFEROL, VITAMIN D3, (VITAMIN D3) 25 MCG (1,000 UNIT) CAPSULE    Take 1,000 Units by mouth Daily.    HYDROXYZINE HCL (ATARAX) 10 MG TAB    Take 1 tablet (10 mg total) by mouth 3 (three) times daily as needed (itching).    MAGNESIUM OXIDE 500 MG TAB    Take 1 tablet by mouth once daily.    MULTIVITAMIN (THERAGRAN) PER TABLET    Take 1 tablet by mouth once daily.    OMEGA-3 FATTY ACIDS/FISH OIL (FISH OIL-OMEGA-3 FATTY ACIDS) 300-1,000 MG CAPSULE    Take 1 g by mouth Daily.           ROS:    Review of Systems   Constitutional:  Negative for chills and fever.   HENT:  Negative for nosebleeds and sore throat.    Eyes:  Negative for pain and visual disturbance.   Respiratory:  Positive for cough. Negative for chest tightness and shortness of breath.     Cardiovascular:  Negative for chest pain.   Gastrointestinal:  Negative for diarrhea, nausea and vomiting.   Genitourinary:  Negative for difficulty urinating, dysuria and hematuria.   Musculoskeletal:  Positive for back pain and gait problem. Negative for myalgias.   Skin:  Negative for rash.   Neurological:  Negative for dizziness, facial asymmetry and headaches.   Psychiatric/Behavioral:  Negative for confusion and sleep disturbance. The patient is not nervous/anxious.      Physical Examination:    Vital Signs:  /68 (BP Location: Other (Comment), Patient Position: Sitting)   Pulse 104   Resp 20   Ht 6' (1.829 m)   Wt 84.4 kg (186 lb)   BMI 25.23 kg/m²      General:  Pleasant. Well-nourished. Well-groomed.    Lungs:  Breathing is quiet, non-labored    Musculoskeletal:   LSO brace is in place.    Neurological:  The patient is awake.  He is slow to respond.  He presents in a wheelchair.      Imaging:  Lumbar x-rays were obtained on 01/24/2023.  This study shows stable appearing L1 compression fracture.      ASSESSMENT/PLAN:     1. Closed compression fracture of body of L1 vertebra  X-Ray Lumbar Spine 2 Or 3 Views         Options were discussed with the patient in his family.  He will continue with the LSO brace.  He will increase his activity as tolerated.  He will return for follow-up at 3 months post injury with lumbar x-rays.        Sariah Grant PA-C

## 2023-01-27 ENCOUNTER — DOCUMENT SCAN (OUTPATIENT)
Dept: HOME HEALTH SERVICES | Facility: HOSPITAL | Age: 83
End: 2023-01-27
Payer: MEDICARE

## 2023-01-31 ENCOUNTER — DOCUMENT SCAN (OUTPATIENT)
Dept: HOME HEALTH SERVICES | Facility: HOSPITAL | Age: 83
End: 2023-01-31
Payer: MEDICARE

## 2023-01-31 ENCOUNTER — EXTERNAL HOME HEALTH (OUTPATIENT)
Dept: HOME HEALTH SERVICES | Facility: HOSPITAL | Age: 83
End: 2023-01-31
Payer: MEDICARE

## 2023-02-10 ENCOUNTER — TELEPHONE (OUTPATIENT)
Dept: INTERNAL MEDICINE | Facility: CLINIC | Age: 83
End: 2023-02-10

## 2023-02-14 DIAGNOSIS — R13.10 DYSPHAGIA, UNSPECIFIED TYPE: Primary | ICD-10-CM

## 2023-02-28 ENCOUNTER — DOCUMENT SCAN (OUTPATIENT)
Dept: HOME HEALTH SERVICES | Facility: HOSPITAL | Age: 83
End: 2023-02-28
Payer: MEDICARE

## 2023-03-01 ENCOUNTER — DOCUMENT SCAN (OUTPATIENT)
Dept: HOME HEALTH SERVICES | Facility: HOSPITAL | Age: 83
End: 2023-03-01
Payer: MEDICARE

## 2023-03-07 ENCOUNTER — TELEPHONE (OUTPATIENT)
Dept: INTERNAL MEDICINE | Facility: CLINIC | Age: 83
End: 2023-03-07
Payer: MEDICARE

## 2023-03-07 DIAGNOSIS — R53.1 WEAKNESS: Primary | ICD-10-CM
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
## 2023-03-07 DIAGNOSIS — R79.89 ELEVATED TROPONIN LEVEL: ICD-10-CM

## 2023-03-07 DIAGNOSIS — S32.018A OTHER CLOSED FRACTURE OF FIRST LUMBAR VERTEBRA, INITIAL ENCOUNTER: ICD-10-CM

## 2023-03-21 ENCOUNTER — HOSPITAL ENCOUNTER (OUTPATIENT)
Dept: RADIOLOGY | Facility: HOSPITAL | Age: 83
Discharge: HOME | End: 2023-03-21
Attending: PHYSICIAN ASSISTANT
Payer: MEDICARE

## 2023-03-21 ENCOUNTER — OFFICE VISIT (OUTPATIENT)
Dept: NEUROSURGERY | Facility: CLINIC | Age: 83
End: 2023-03-21
Payer: MEDICARE

## 2023-03-21 VITALS
BODY MASS INDEX: 23.43 KG/M2 | WEIGHT: 173 LBS | HEART RATE: 79 BPM | HEIGHT: 72 IN | DIASTOLIC BLOOD PRESSURE: 68 MMHG | RESPIRATION RATE: 20 BRPM | SYSTOLIC BLOOD PRESSURE: 108 MMHG

## 2023-03-21 DIAGNOSIS — S32.010A CLOSED COMPRESSION FRACTURE OF BODY OF L1 VERTEBRA: ICD-10-CM

## 2023-03-21 DIAGNOSIS — S32.010A CLOSED COMPRESSION FRACTURE OF BODY OF L1 VERTEBRA: Primary | ICD-10-CM

## 2023-03-21 PROCEDURE — 99213 OFFICE O/P EST LOW 20 MIN: CPT | Mod: ,,, | Performed by: PHYSICIAN ASSISTANT

## 2023-03-21 PROCEDURE — 1101F PR PT FALLS ASSESS DOC 0-1 FALLS W/OUT INJ PAST YR: ICD-10-PCS | Mod: CPTII,,, | Performed by: PHYSICIAN ASSISTANT

## 2023-03-21 PROCEDURE — 3074F SYST BP LT 130 MM HG: CPT | Mod: CPTII,,, | Performed by: PHYSICIAN ASSISTANT

## 2023-03-21 PROCEDURE — 3288F PR FALLS RISK ASSESSMENT DOCUMENTED: ICD-10-PCS | Mod: CPTII,,, | Performed by: PHYSICIAN ASSISTANT

## 2023-03-21 PROCEDURE — 99213 PR OFFICE/OUTPT VISIT, EST, LEVL III, 20-29 MIN: ICD-10-PCS | Mod: ,,, | Performed by: PHYSICIAN ASSISTANT

## 2023-03-21 PROCEDURE — 3078F PR MOST RECENT DIASTOLIC BLOOD PRESSURE < 80 MM HG: ICD-10-PCS | Mod: CPTII,,, | Performed by: PHYSICIAN ASSISTANT

## 2023-03-21 PROCEDURE — 3074F PR MOST RECENT SYSTOLIC BLOOD PRESSURE < 130 MM HG: ICD-10-PCS | Mod: CPTII,,, | Performed by: PHYSICIAN ASSISTANT

## 2023-03-21 PROCEDURE — 3288F FALL RISK ASSESSMENT DOCD: CPT | Mod: CPTII,,, | Performed by: PHYSICIAN ASSISTANT

## 2023-03-21 PROCEDURE — 1101F PT FALLS ASSESS-DOCD LE1/YR: CPT | Mod: CPTII,,, | Performed by: PHYSICIAN ASSISTANT

## 2023-03-21 PROCEDURE — 72100 X-RAY EXAM L-S SPINE 2/3 VWS: CPT | Mod: TC

## 2023-03-21 PROCEDURE — 1160F PR REVIEW ALL MEDS BY PRESCRIBER/CLIN PHARMACIST DOCUMENTED: ICD-10-PCS | Mod: CPTII,,, | Performed by: PHYSICIAN ASSISTANT

## 2023-03-21 PROCEDURE — 1126F AMNT PAIN NOTED NONE PRSNT: CPT | Mod: CPTII,,, | Performed by: PHYSICIAN ASSISTANT

## 2023-03-21 PROCEDURE — 1159F PR MEDICATION LIST DOCUMENTED IN MEDICAL RECORD: ICD-10-PCS | Mod: CPTII,,, | Performed by: PHYSICIAN ASSISTANT

## 2023-03-21 PROCEDURE — 3078F DIAST BP <80 MM HG: CPT | Mod: CPTII,,, | Performed by: PHYSICIAN ASSISTANT

## 2023-03-21 PROCEDURE — 1126F PR PAIN SEVERITY QUANTIFIED, NO PAIN PRESENT: ICD-10-PCS | Mod: CPTII,,, | Performed by: PHYSICIAN ASSISTANT

## 2023-03-21 PROCEDURE — 1159F MED LIST DOCD IN RCRD: CPT | Mod: CPTII,,, | Performed by: PHYSICIAN ASSISTANT

## 2023-03-21 PROCEDURE — 1160F RVW MEDS BY RX/DR IN RCRD: CPT | Mod: CPTII,,, | Performed by: PHYSICIAN ASSISTANT

## 2023-03-21 NOTE — PROGRESS NOTES
[facility]  History & Physical  Neurosurgery      Asael Ramos   [id number]   1940       HPI:  Asael Ramos is a 82 y.o. male who presents for follow up appointment with lumbar x-rays.  He was initially seen by Dr. Pettit on 12/29/2022.  He was admitted on 12/28/2022 with COVID.  He was symptomatic 3-4 days prior to his admission.  Two days prior, he fell while walking to the restroom.  He was found to have an L1 compression fracture.  He was treated conservatively with LSO brace.      He is three months out from his fall.  He is pain free.  He has been working with therapy at home.        Past Medical History:   Diagnosis Date    A-fib     Brain concussion     CAD (coronary artery disease)     Malignant melanoma of skin, unspecified     Mild cognitive impairment     Mixed hyperlipidemia     Pulmonary hypertension     Shingles     Sleep apnea, unspecified        Past Surgical History:   Procedure Laterality Date    CATARACT EXTRACTION      CORONARY STENT PLACEMENT      HIP REPLACEMENT ARTHROPLASTY      TOTAL SHOULDER ARTHROPLASTY Bilateral        Family History   Problem Relation Age of Onset    Breast cancer Mother     Cancer Brother        Social History     Socioeconomic History    Marital status:    Tobacco Use    Smoking status: Never    Smokeless tobacco: Never   Substance and Sexual Activity    Alcohol use: Yes     Alcohol/week: 2.0 standard drinks     Types: 1 Glasses of wine, 1 Cans of beer per week       Current Outpatient Medications   Medication Sig Dispense Refill    acetaminophen (TYLENOL) 500 MG tablet Take 500 mg by mouth every 6 (six) hours as needed for Pain.      albuterol (PROVENTIL) 2.5 mg /3 mL (0.083 %) nebulizer solution INHALE ONE VIAL VIA NEBULIZER FOUR TIMES DAILY AS NEEDED FOR WHEEZING      apixaban (ELIQUIS) 2.5 mg Tab Take 1 tablet (2.5 mg total) by mouth 2 (two) times a day.      cholecalciferol, vitamin D3, (VITAMIN D3) 25 mcg (1,000 unit) capsule Take  1,000 Units by mouth Daily.      magnesium oxide 500 mg Tab Take 1 tablet by mouth once daily.      multivitamin (THERAGRAN) per tablet Take 1 tablet by mouth once daily.      omega-3 fatty acids/fish oil (FISH OIL-OMEGA-3 FATTY ACIDS) 300-1,000 mg capsule Take 1 g by mouth Daily.      hydrOXYzine HCL (ATARAX) 10 MG Tab Take 1 tablet (10 mg total) by mouth 3 (three) times daily as needed (itching). (Patient not taking: Reported on 3/21/2023) 90 tablet 5     No current facility-administered medications for this visit.       Review of patient's allergies indicates:  No Known Allergies     Medication List with Changes/Refills   Current Medications    ACETAMINOPHEN (TYLENOL) 500 MG TABLET    Take 500 mg by mouth every 6 (six) hours as needed for Pain.    ALBUTEROL (PROVENTIL) 2.5 MG /3 ML (0.083 %) NEBULIZER SOLUTION    INHALE ONE VIAL VIA NEBULIZER FOUR TIMES DAILY AS NEEDED FOR WHEEZING    APIXABAN (ELIQUIS) 2.5 MG TAB    Take 1 tablet (2.5 mg total) by mouth 2 (two) times a day.    CHOLECALCIFEROL, VITAMIN D3, (VITAMIN D3) 25 MCG (1,000 UNIT) CAPSULE    Take 1,000 Units by mouth Daily.    HYDROXYZINE HCL (ATARAX) 10 MG TAB    Take 1 tablet (10 mg total) by mouth 3 (three) times daily as needed (itching).    MAGNESIUM OXIDE 500 MG TAB    Take 1 tablet by mouth once daily.    MULTIVITAMIN (THERAGRAN) PER TABLET    Take 1 tablet by mouth once daily.    OMEGA-3 FATTY ACIDS/FISH OIL (FISH OIL-OMEGA-3 FATTY ACIDS) 300-1,000 MG CAPSULE    Take 1 g by mouth Daily.           ROS:    Review of Systems   Constitutional:  Negative for chills and fever.   HENT:  Negative for nosebleeds and sore throat.    Eyes:  Negative for pain and visual disturbance.   Respiratory:  Negative for cough, chest tightness and shortness of breath.    Cardiovascular:  Negative for chest pain.   Gastrointestinal:  Negative for diarrhea, nausea and vomiting.   Genitourinary:  Negative for difficulty urinating, dysuria and hematuria.   Musculoskeletal:   Positive for gait problem. Negative for back pain and myalgias.   Skin:  Negative for rash.   Neurological:  Negative for dizziness, facial asymmetry, numbness and headaches.   Psychiatric/Behavioral:  Negative for confusion and sleep disturbance. The patient is not nervous/anxious.      Physical Examination:    Vital Signs:  /68 (BP Location: Other (Comment), Patient Position: Sitting)   Pulse 79   Resp 20   Ht 6' (1.829 m)   Wt 78.5 kg (173 lb)   BMI 23.46 kg/m²      General:  Pleasant. Well-groomed.  Thin.    Lungs:  Breathing is quiet, non-labored    Neurological:  The patient is awake and alert.  He is moving all extremities relatively well.  Gait is slow.      Imaging:  Three views of the lumbar spine were obtained today, 3/21/2023.  This study shows stable L1 compression fracture.  There is multilevel disc degeneration and scoliosis through the lumbar spine.      ASSESSMENT/PLAN:     1. Closed compression fracture of body of L1 vertebra          The compression fracture is healed.  Arrangements have been made by his primary care physician for outpatient physical therapy.  I have encouraged him to participate in the outpatient therapy.  He will return to office on an as-needed basis.        Sariah Grant PA-C

## 2023-03-23 ENCOUNTER — DOCUMENT SCAN (OUTPATIENT)
Dept: HOME HEALTH SERVICES | Facility: HOSPITAL | Age: 83
End: 2023-03-23
Payer: MEDICARE

## 2023-04-19 ENCOUNTER — DOCUMENT SCAN (OUTPATIENT)
Dept: HOME HEALTH SERVICES | Facility: HOSPITAL | Age: 83
End: 2023-04-19
Payer: MEDICARE

## 2023-05-05 ENCOUNTER — TELEPHONE (OUTPATIENT)
Dept: INTERNAL MEDICINE | Facility: CLINIC | Age: 83
End: 2023-05-05
Payer: MEDICARE

## 2023-05-05 DIAGNOSIS — E78.2 MIXED HYPERLIPIDEMIA: ICD-10-CM

## 2023-05-05 DIAGNOSIS — Z79.899 ENCOUNTER FOR LONG-TERM (CURRENT) USE OF MEDICATIONS: ICD-10-CM

## 2023-05-05 DIAGNOSIS — Z12.5 SCREENING FOR PROSTATE CANCER: ICD-10-CM

## 2023-05-05 DIAGNOSIS — I48.0 PAROXYSMAL ATRIAL FIBRILLATION: Primary | ICD-10-CM

## 2023-05-05 DIAGNOSIS — I10 HYPERTENSION, UNSPECIFIED TYPE: ICD-10-CM

## 2023-06-15 ENCOUNTER — TELEPHONE (OUTPATIENT)
Dept: ADMINISTRATIVE | Facility: HOSPITAL | Age: 83
End: 2023-06-15
Payer: MEDICARE

## 2023-06-15 NOTE — TELEPHONE ENCOUNTER
----- Message from Roseline Sanchez MA sent at 6/14/2023  1:57 PM CDT -----  Regarding: Dr BOLIVAR AVILA Wednesday 6-21-23       1. Are there any outstanding Labs, imaging or referrals in the patient's chart?      Wellness Appointment    Fasting wellness labs ordered and ready to do.     Last Wellness 1-18-22           2. . Has the patient been seen in an ER, urgent care clinic, or any other health care    provider since their last visit? If yes when and where?

## 2023-06-20 ENCOUNTER — LAB VISIT (OUTPATIENT)
Dept: LAB | Facility: HOSPITAL | Age: 83
End: 2023-06-20
Attending: INTERNAL MEDICINE
Payer: MEDICARE

## 2023-06-20 DIAGNOSIS — I10 HYPERTENSION, UNSPECIFIED TYPE: ICD-10-CM

## 2023-06-20 DIAGNOSIS — I48.0 PAROXYSMAL ATRIAL FIBRILLATION: ICD-10-CM

## 2023-06-20 DIAGNOSIS — Z79.899 ENCOUNTER FOR LONG-TERM (CURRENT) USE OF MEDICATIONS: ICD-10-CM

## 2023-06-20 DIAGNOSIS — E78.2 MIXED HYPERLIPIDEMIA: ICD-10-CM

## 2023-06-20 LAB
ALBUMIN SERPL-MCNC: 4 G/DL (ref 3.4–4.8)
ALBUMIN/GLOB SERPL: 1.4 RATIO (ref 1.1–2)
ALP SERPL-CCNC: 136 UNIT/L (ref 40–150)
ALT SERPL-CCNC: 22 UNIT/L (ref 0–55)
AST SERPL-CCNC: 25 UNIT/L (ref 5–34)
BASOPHILS # BLD AUTO: 0.02 X10(3)/MCL
BASOPHILS NFR BLD AUTO: 0.4 %
BILIRUBIN DIRECT+TOT PNL SERPL-MCNC: 1.4 MG/DL
BUN SERPL-MCNC: 18.4 MG/DL (ref 8.4–25.7)
CALCIUM SERPL-MCNC: 10.1 MG/DL (ref 8.8–10)
CHLORIDE SERPL-SCNC: 107 MMOL/L (ref 98–107)
CHOLEST SERPL-MCNC: 180 MG/DL
CHOLEST/HDLC SERPL: 4 {RATIO} (ref 0–5)
CO2 SERPL-SCNC: 28 MMOL/L (ref 23–31)
CREAT SERPL-MCNC: 1.03 MG/DL (ref 0.73–1.18)
EOSINOPHIL # BLD AUTO: 0.13 X10(3)/MCL (ref 0–0.9)
EOSINOPHIL NFR BLD AUTO: 2.3 %
ERYTHROCYTE [DISTWIDTH] IN BLOOD BY AUTOMATED COUNT: 14.8 % (ref 11.5–17)
GFR SERPLBLD CREATININE-BSD FMLA CKD-EPI: >60 MLS/MIN/1.73/M2
GLOBULIN SER-MCNC: 2.8 GM/DL (ref 2.4–3.5)
GLUCOSE SERPL-MCNC: 94 MG/DL (ref 82–115)
HCT VFR BLD AUTO: 43.3 % (ref 42–52)
HDLC SERPL-MCNC: 45 MG/DL (ref 35–60)
HGB BLD-MCNC: 14.2 G/DL (ref 14–18)
IMM GRANULOCYTES # BLD AUTO: 0.02 X10(3)/MCL (ref 0–0.04)
IMM GRANULOCYTES NFR BLD AUTO: 0.4 %
LDLC SERPL CALC-MCNC: 115 MG/DL (ref 50–140)
LYMPHOCYTES # BLD AUTO: 1.47 X10(3)/MCL (ref 0.6–4.6)
LYMPHOCYTES NFR BLD AUTO: 25.9 %
MCH RBC QN AUTO: 31.6 PG (ref 27–31)
MCHC RBC AUTO-ENTMCNC: 32.8 G/DL (ref 33–36)
MCV RBC AUTO: 96.4 FL (ref 80–94)
MONOCYTES # BLD AUTO: 0.43 X10(3)/MCL (ref 0.1–1.3)
MONOCYTES NFR BLD AUTO: 7.6 %
NEUTROPHILS # BLD AUTO: 3.61 X10(3)/MCL (ref 2.1–9.2)
NEUTROPHILS NFR BLD AUTO: 63.4 %
NRBC BLD AUTO-RTO: 0 %
PLATELET # BLD AUTO: 155 X10(3)/MCL (ref 130–400)
PMV BLD AUTO: 10 FL (ref 7.4–10.4)
POTASSIUM SERPL-SCNC: 4.3 MMOL/L (ref 3.5–5.1)
PROT SERPL-MCNC: 6.8 GM/DL (ref 5.8–7.6)
RBC # BLD AUTO: 4.49 X10(6)/MCL (ref 4.7–6.1)
SODIUM SERPL-SCNC: 143 MMOL/L (ref 136–145)
TRIGL SERPL-MCNC: 100 MG/DL (ref 34–140)
TSH SERPL-ACNC: 2.35 UIU/ML (ref 0.35–4.94)
VLDLC SERPL CALC-MCNC: 20 MG/DL
WBC # SPEC AUTO: 5.68 X10(3)/MCL (ref 4.5–11.5)

## 2023-06-20 PROCEDURE — 80053 COMPREHEN METABOLIC PANEL: CPT

## 2023-06-20 PROCEDURE — 80061 LIPID PANEL: CPT

## 2023-06-20 PROCEDURE — 36415 COLL VENOUS BLD VENIPUNCTURE: CPT

## 2023-06-20 PROCEDURE — 85025 COMPLETE CBC W/AUTO DIFF WBC: CPT

## 2023-06-20 PROCEDURE — 84443 ASSAY THYROID STIM HORMONE: CPT

## 2023-06-21 ENCOUNTER — OFFICE VISIT (OUTPATIENT)
Dept: INTERNAL MEDICINE | Facility: CLINIC | Age: 83
End: 2023-06-21
Payer: MEDICARE

## 2023-06-21 VITALS
BODY MASS INDEX: 24.65 KG/M2 | HEIGHT: 72 IN | HEART RATE: 76 BPM | WEIGHT: 182 LBS | DIASTOLIC BLOOD PRESSURE: 64 MMHG | OXYGEN SATURATION: 94 % | SYSTOLIC BLOOD PRESSURE: 114 MMHG | TEMPERATURE: 98 F

## 2023-06-21 DIAGNOSIS — I48.0 PAROXYSMAL ATRIAL FIBRILLATION: Primary | ICD-10-CM

## 2023-06-21 DIAGNOSIS — F03.B0 MODERATE DEMENTIA WITHOUT BEHAVIORAL DISTURBANCE, PSYCHOTIC DISTURBANCE, MOOD DISTURBANCE, OR ANXIETY, UNSPECIFIED DEMENTIA TYPE: ICD-10-CM

## 2023-06-21 DIAGNOSIS — Z00.00 MEDICARE ANNUAL WELLNESS VISIT, SUBSEQUENT: ICD-10-CM

## 2023-06-21 PROCEDURE — 3074F SYST BP LT 130 MM HG: CPT | Mod: CPTII,,, | Performed by: INTERNAL MEDICINE

## 2023-06-21 PROCEDURE — 1126F PR PAIN SEVERITY QUANTIFIED, NO PAIN PRESENT: ICD-10-PCS | Mod: CPTII,,, | Performed by: INTERNAL MEDICINE

## 2023-06-21 PROCEDURE — 1160F RVW MEDS BY RX/DR IN RCRD: CPT | Mod: CPTII,,, | Performed by: INTERNAL MEDICINE

## 2023-06-21 PROCEDURE — 3288F PR FALLS RISK ASSESSMENT DOCUMENTED: ICD-10-PCS | Mod: CPTII,,, | Performed by: INTERNAL MEDICINE

## 2023-06-21 PROCEDURE — G0439 PR MEDICARE ANNUAL WELLNESS SUBSEQUENT VISIT: ICD-10-PCS | Mod: ,,, | Performed by: INTERNAL MEDICINE

## 2023-06-21 PROCEDURE — 1159F PR MEDICATION LIST DOCUMENTED IN MEDICAL RECORD: ICD-10-PCS | Mod: CPTII,,, | Performed by: INTERNAL MEDICINE

## 2023-06-21 PROCEDURE — 1159F MED LIST DOCD IN RCRD: CPT | Mod: CPTII,,, | Performed by: INTERNAL MEDICINE

## 2023-06-21 PROCEDURE — 3078F DIAST BP <80 MM HG: CPT | Mod: CPTII,,, | Performed by: INTERNAL MEDICINE

## 2023-06-21 PROCEDURE — 3074F PR MOST RECENT SYSTOLIC BLOOD PRESSURE < 130 MM HG: ICD-10-PCS | Mod: CPTII,,, | Performed by: INTERNAL MEDICINE

## 2023-06-21 PROCEDURE — 99213 PR OFFICE/OUTPT VISIT, EST, LEVL III, 20-29 MIN: ICD-10-PCS | Mod: 25,,, | Performed by: INTERNAL MEDICINE

## 2023-06-21 PROCEDURE — G0439 PPPS, SUBSEQ VISIT: HCPCS | Mod: ,,, | Performed by: INTERNAL MEDICINE

## 2023-06-21 PROCEDURE — 3078F PR MOST RECENT DIASTOLIC BLOOD PRESSURE < 80 MM HG: ICD-10-PCS | Mod: CPTII,,, | Performed by: INTERNAL MEDICINE

## 2023-06-21 PROCEDURE — 99213 OFFICE O/P EST LOW 20 MIN: CPT | Mod: 25,,, | Performed by: INTERNAL MEDICINE

## 2023-06-21 PROCEDURE — 1101F PT FALLS ASSESS-DOCD LE1/YR: CPT | Mod: CPTII,,, | Performed by: INTERNAL MEDICINE

## 2023-06-21 PROCEDURE — 3288F FALL RISK ASSESSMENT DOCD: CPT | Mod: CPTII,,, | Performed by: INTERNAL MEDICINE

## 2023-06-21 PROCEDURE — 1160F PR REVIEW ALL MEDS BY PRESCRIBER/CLIN PHARMACIST DOCUMENTED: ICD-10-PCS | Mod: CPTII,,, | Performed by: INTERNAL MEDICINE

## 2023-06-21 PROCEDURE — 1126F AMNT PAIN NOTED NONE PRSNT: CPT | Mod: CPTII,,, | Performed by: INTERNAL MEDICINE

## 2023-06-21 PROCEDURE — 1101F PR PT FALLS ASSESS DOC 0-1 FALLS W/OUT INJ PAST YR: ICD-10-PCS | Mod: CPTII,,, | Performed by: INTERNAL MEDICINE

## 2023-06-21 RX ORDER — MEMANTINE HYDROCHLORIDE 5 MG/1
5 TABLET ORAL 2 TIMES DAILY
Qty: 60 TABLET | Refills: 11 | Status: SHIPPED | OUTPATIENT
Start: 2023-06-21 | End: 2024-06-20

## 2023-06-21 NOTE — PROGRESS NOTES
[facility] well sub    Patient ID: [id number]     Chief Complaint: Medicare AWV (Wellness/)      HPI:     Asael Ramos is a 82 y.o. male here today for a Medicare Wellness. No other complaints today.     Mr Vyas, (Smokey) is a 82-year-old gentleman with atrial fibrillation, anticoagulated with Eliquis and MMSE score of 21/30.   Here today for Medicare wellness visit.  Labs are reviewed and noted to be all essentially normal.  Doing well overall and has no acute needs or complaints.  Did not do very well with the Keytruda and is advised to follow-up with his oncologist regarding this.    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  We did discuss getting him started on memantine.  Wife was initially reluctant however Pola White was present at the visit and after a long discussion finally family members agreed to get him started so as to preserve her slow down progression.    We did discuss possibly increasing the dose in the future versus adding Aricept.      ENT: Dr. Wheeler  Oncologist: Dr. Acosta  Dermatology: Dr. Keith  Neurologist: Dr. Richmond  Cardiologist: Dr. Sharif     Wellness:  06/21/23  Pn: UTD    ----------------------------  A-fib  Brain concussion  CAD (coronary artery disease)  Malignant melanoma of skin, unspecified  Mild cognitive impairment  Mixed hyperlipidemia  Pulmonary hypertension  Shingles  Sleep apnea, unspecified     Past Surgical History:   Procedure Laterality Date    CATARACT EXTRACTION      CORONARY STENT PLACEMENT      HIP REPLACEMENT ARTHROPLASTY      TOTAL SHOULDER ARTHROPLASTY Bilateral        Review of patient's allergies indicates:  No Known Allergies    Outpatient Medications Marked as Taking for the 6/21/23 encounter (Office Visit) with Aline Montoya MD   Medication Sig Dispense Refill    acetaminophen (TYLENOL) 500 MG tablet Take 500 mg by  mouth every 6 (six) hours as needed for Pain.      apixaban (ELIQUIS) 2.5 mg Tab Take 1 tablet (2.5 mg total) by mouth 2 (two) times a day.      cholecalciferol, vitamin D3, (VITAMIN D3) 25 mcg (1,000 unit) capsule Take 1,000 Units by mouth Daily.      hydrOXYzine HCL (ATARAX) 10 MG Tab Take 1 tablet (10 mg total) by mouth 3 (three) times daily as needed (itching). 90 tablet 5    multivitamin (THERAGRAN) per tablet Take 1 tablet by mouth once daily.      [DISCONTINUED] albuterol (PROVENTIL) 2.5 mg /3 mL (0.083 %) nebulizer solution INHALE ONE VIAL VIA NEBULIZER FOUR TIMES DAILY AS NEEDED FOR WHEEZING      [DISCONTINUED] magnesium oxide 500 mg Tab Take 1 tablet by mouth once daily.         Social History     Socioeconomic History    Marital status:    Tobacco Use    Smoking status: Never    Smokeless tobacco: Never   Substance and Sexual Activity    Alcohol use: Yes     Alcohol/week: 2.0 standard drinks     Types: 1 Glasses of wine, 1 Cans of beer per week     Social Determinants of Health     Financial Resource Strain: Low Risk     Difficulty of Paying Living Expenses: Not hard at all   Food Insecurity: No Food Insecurity    Worried About Running Out of Food in the Last Year: Never true    Ran Out of Food in the Last Year: Never true   Transportation Needs: No Transportation Needs    Lack of Transportation (Medical): No    Lack of Transportation (Non-Medical): No   Physical Activity: Insufficiently Active    Days of Exercise per Week: 2 days    Minutes of Exercise per Session: 60 min   Stress: No Stress Concern Present    Feeling of Stress : Not at all   Social Connections: Moderately Integrated    Frequency of Communication with Friends and Family: More than three times a week    Frequency of Social Gatherings with Friends and Family: More than three times a week    Attends [employer] Services: More than 4 times per year    Active Member of Clubs or Organizations: No    Attends Club or Organization Meetings:  Never    Marital Status:    Housing Stability: Low Risk     Unable to Pay for Housing in the Last Year: No    Number of Places Lived in the Last Year: 1    Unstable Housing in the Last Year: No        Family History   Problem Relation Age of Onset    Breast cancer Mother     Cancer Brother         Patient Care Team:  Aline Montoya MD as PCP - General (Internal Medicine)  Viraj Acosta MD (Family Medicine)     Opioid Screening: Patient medication list reviewed, patient is not taking prescription opioids. Patient is not using additional opioids than prescribed. Patient is at low risk of substance abuse based on this opioid use history.       Subjective:     ROS    A comprehensive review of systems is obtained and is essentially negative except for that stated in the HPI     Patient Reported Health Risk Assessment  What is your age?: 80 or older  Are you male or female?: Male  During the past four weeks, how much have you been bothered by emotional problems such as feeling anxious, depressed, irritable, sad, or downhearted and blue?: Not at all  During the past five weeks, has your physical and/or emotional health limited your social activities with family, friends, neighbors, or groups?: Not at all  During the past four weeks, how much bodily pain have you generally had?: No pain  During the past four weeks, was someone available to help if you needed and wanted help?: Yes, as much as I wanted  During the past four weeks, what was the hardest physical activity you could do for at least two minutes?: Very light  Can you get to places out of walking distance without help?  (For example, can you travel alone on buses or taxis, or drive your own car?): No  Can you go shopping for groceries or clothes without someone's help?: No  Can you prepare your own meals?: Yes  Can you do your own housework without help?: No  Because of any health problems, do you need the help of another person with your personal  care needs such as eating, bathing, dressing, or getting around the house?: No  Can you handle your own money without help?: Yes  During the past four weeks, how would you rate your health in general?: Very good  How have things been going for you during the past four weeks?: Pretty well  Are you having difficulties driving your car?: No  Do you always fasten your seat belt when you are in a car?: Yes, usually  How often in the past four weeks have you been bothered by falling or dizzy when standing up?: Never  How often in the past four weeks have you been bothered by sexual problems?: Never  How often in the past four weeks have you been bothered by trouble eating well?: Never  How often in the past four weeks have you been bothered by teeth or denture problems?: Never  How often in the past four weeks have you been bothered with problems using the telephone?: Never  How often in the past four weeks have you been bothered by tiredness or fatigue?: Never  Have you fallen two or more times in the past year?: No  Are you afraid of falling?: Yes  Are you a smoker?: No  During the past four weeks, how many drinks of wine, beer, or other alcoholic beverages did you have?: One drink or less per week  Do you exercise for about 20 minutes three or more days a week?: Yes, some of the time  Have you been given any information to help you with hazards in your house that might hurt you?: Yes  Have you been given any information to help you with keeping track of your medications?: Yes  How often do you have trouble taking medicines the way you've been told to take them?: I always take them as prescribed  How confident are you that you can control and manage most of your health problems?: Very confident  What is your race? (Check all that apply.):     Objective:     /64 (BP Location: Left arm, Patient Position: Sitting, BP Method: Small (Manual))   Pulse 76   Temp 97.8 °F (36.6 °C) (Temporal)   Ht 6' (1.829 m)    Wt 82.6 kg (182 lb)   SpO2 (!) 94%   BMI 24.68 kg/m²     Physical Exam  Constitutional:       Appearance: Normal appearance.   HENT:      Head: Normocephalic and atraumatic.      Nose: Nose normal.      Mouth/Throat:      Mouth: Mucous membranes are moist.      Pharynx: Oropharynx is clear.   Eyes:      Extraocular Movements: Extraocular movements intact.      Pupils: Pupils are equal, round, and reactive to light.   Cardiovascular:      Rate and Rhythm: Normal rate and regular rhythm.      Pulses: Normal pulses.   Pulmonary:      Effort: Pulmonary effort is normal.      Breath sounds: Normal breath sounds.   Abdominal:      General: Bowel sounds are normal.      Palpations: Abdomen is soft.   Musculoskeletal:         General: Normal range of motion.      Cervical back: Normal range of motion and neck supple.   Skin:     General: Skin is warm.   Neurological:      General: No focal deficit present.      Mental Status: He is alert and oriented to person, place, and time. Mental status is at baseline.   Psychiatric:         Mood and Affect: Mood normal.         No flowsheet data found.  Fall Risk Assessment - Outpatient 6/21/2023 3/21/2023 1/24/2023 10/19/2022   Mobility Status Ambulatory Ambulatory Wheelchair Bound Ambulatory   Number of falls 1 0 1 0   Identified as fall risk 0 0 1 0           Depression Screening  Over the past two weeks, has the patient felt down, depressed, or hopeless?: No  Over the past two weeks, has the patient felt little interest or pleasure in doing things?: No  Functional Ability/Safety Screening  Was the patient's timed Up & Go test unsteady or longer than 30 seconds?: Yes  Does the patient need help with phone, transportation, shopping, preparing meals, housework, laundry, meds, or managing money?: Yes  Does the patient's home have rugs in the hallway, lack grab bars in the bathroom, lack handrails on the stairs or have poor lighting?: No  Have you noticed any hearing difficulties?:  "No  Cognitive Function (Assessed through direct observation with due consideration of information obtained by way of patient reports and/or concerns raised by family, friends, caretakers, or others)    Does the patient repeat questions/statements in the same day?: No  Does the patient have trouble remembering the date, year, and time?: No  Does the patient have difficulty managing finances?: No  Does the patient have a decreased sense of direction?: No  A "Yes" response to any of the above questions could indicate mild cognitive impairment and should trigger further investigation.: 0      Assessment:     Problem List Items Addressed This Visit          Neuro    Dementia     -patient does not have any medications, will initiate on memantine 5 mg p.o. b.i.d.            Cardiac/Vascular    A-fib - Primary     -doing well, rate and rhythm controlled   -anticoagulated with Eliquis, continue            Other    Medicare annual wellness visit, subsequent     -labs are reviewed all essentially normal  -patient is advised on importance of watching his carbohydrate intake and saturated fat intake, making the right nutritional choices and exercising on a regular basis  -up-to-date with the screening            Plan:     Medicare Annual Wellness and Personalized Prevention Plan:   Fall Risk + Home Safety + Hearing Impairment + Depression Screen + Cognitive Impairment Screen + Health Risk Assessment all reviewed.       Health Maintenance Topics with due status: Not Due       Topic Last Completion Date    Lipid Panel 06/20/2023      The patient's Health Maintenance was reviewed and the following appears to be due at this time:   There are no preventive care reminders to display for this patient.      Advance Care Planning   Deffered    Medication List with Changes/Refills   New Medications    MEMANTINE (NAMENDA) 5 MG TAB    Take 1 tablet (5 mg total) by mouth 2 (two) times daily.       Start Date: 6/21/2023 End Date: 6/20/2024 "   Current Medications    ACETAMINOPHEN (TYLENOL) 500 MG TABLET    Take 500 mg by mouth every 6 (six) hours as needed for Pain.       Start Date: --        End Date: --    APIXABAN (ELIQUIS) 2.5 MG TAB    Take 1 tablet (2.5 mg total) by mouth 2 (two) times a day.       Start Date: 1/5/2023  End Date: --    CHOLECALCIFEROL, VITAMIN D3, (VITAMIN D3) 25 MCG (1,000 UNIT) CAPSULE    Take 1,000 Units by mouth Daily.       Start Date: --        End Date: --    HYDROXYZINE HCL (ATARAX) 10 MG TAB    Take 1 tablet (10 mg total) by mouth 3 (three) times daily as needed (itching).       Start Date: 10/19/2022End Date: --    MULTIVITAMIN (THERAGRAN) PER TABLET    Take 1 tablet by mouth once daily.       Start Date: --        End Date: --   Discontinued Medications    ALBUTEROL (PROVENTIL) 2.5 MG /3 ML (0.083 %) NEBULIZER SOLUTION    INHALE ONE VIAL VIA NEBULIZER FOUR TIMES DAILY AS NEEDED FOR WHEEZING       Start Date: 1/7/2023  End Date: 6/21/2023    MAGNESIUM OXIDE 500 MG TAB    Take 1 tablet by mouth once daily.       Start Date: --        End Date: 6/21/2023    OMEGA-3 FATTY ACIDS/FISH OIL (FISH OIL-OMEGA-3 FATTY ACIDS) 300-1,000 MG CAPSULE    Take 1 g by mouth Daily.       Start Date: --        End Date: 6/21/2023        Follow up in about 6 months (around 12/21/2023) for BP Check. In addition to their scheduled follow up, the patient has also been instructed to follow up on as needed basis.

## 2023-06-21 NOTE — ASSESSMENT & PLAN NOTE
-labs are reviewed all essentially normal  -patient is advised on importance of watching his carbohydrate intake and saturated fat intake, making the right nutritional choices and exercising on a regular basis  -up-to-date with the screening

## 2023-07-24 ENCOUNTER — TELEPHONE (OUTPATIENT)
Dept: INTERNAL MEDICINE | Facility: CLINIC | Age: 83
End: 2023-07-24
Payer: MEDICARE

## 2023-07-24 DIAGNOSIS — F03.B0 MODERATE DEMENTIA WITHOUT BEHAVIORAL DISTURBANCE, PSYCHOTIC DISTURBANCE, MOOD DISTURBANCE, OR ANXIETY, UNSPECIFIED DEMENTIA TYPE: Primary | ICD-10-CM

## 2023-07-24 DIAGNOSIS — R13.10 DYSPHAGIA, UNSPECIFIED TYPE: ICD-10-CM

## 2023-07-24 NOTE — TELEPHONE ENCOUNTER
----- Message from Rosina Vázquez sent at 7/24/2023  1:40 PM CDT -----  Regarding: referral  Type:  Needs Medical Advice    Who Called: pt's wife Perla  Would the patient rather a call back or a response via [facility]? C/b  Best Call Back Number: [phone]  Additional Information: pt's wife called pt is finishing physical therapy, but wife would like pcp to order a script for speech therapy.  Wife stated they would like to give speech therapy a try  Please contact wife to discuss.  Please send to [facility]

## 2023-08-01 ENCOUNTER — TELEPHONE (OUTPATIENT)
Dept: INTERNAL MEDICINE | Facility: CLINIC | Age: 83
End: 2023-08-01
Payer: MEDICARE

## 2023-08-01 NOTE — TELEPHONE ENCOUNTER
Shagufta with [facility] called to say patient was in therapy with them and she called and spoke with the wife. He does not qualify for in home speech therapy because he is not home bound. He goes out and about.  She offered to have something set up on an outpatient basis with them but wife did not agree. She will get with wife again about him qualifying. Will call office back with an update

## 2023-08-01 NOTE — TELEPHONE ENCOUNTER
----- Message from Mago Lobo sent at 8/1/2023  2:02 PM CDT -----  Regarding: Return call  .Type:  Patient Returning Call    Who Called:[facility]  Who Left Message for Patient:Shagufta  Does the patient know what this is regarding?:Already doing pt  Would the patient rather a call back or a response via [facility]?   Best Call Back Number:[phone]  Additional Information: Patient is already during pt . Want Ann-Marie to call her back

## 2023-09-25 PROBLEM — Z00.00 MEDICARE ANNUAL WELLNESS VISIT, SUBSEQUENT: Status: RESOLVED | Noted: 2023-06-21 | Resolved: 2023-09-25

## 2023-12-27 DIAGNOSIS — I10 HYPERTENSION, UNSPECIFIED TYPE: Primary | ICD-10-CM

## 2024-02-17 PROCEDURE — G0180 MD CERTIFICATION HHA PATIENT: HCPCS | Mod: ,,, | Performed by: INTERNAL MEDICINE

## 2024-02-21 ENCOUNTER — TELEPHONE (OUTPATIENT)
Dept: INTERNAL MEDICINE | Facility: CLINIC | Age: 84
End: 2024-02-21
Payer: MEDICARE

## 2024-02-21 NOTE — TELEPHONE ENCOUNTER
----- Message from [facility] sent at 2/21/2024  2:32 PM CST -----  .Type:  Needs Medical Advice    Who Called:  Kassie with [facility]    Symptoms (please be specific):  pt tested positive for Covid 2-20-24     How long has patient had these symptoms:   one day    Pharmacy name and phone #:  [phone] [facility]     Would the patient rather a call back or a response via [facility]?      Best Call Back Number:  [phone]    Additional Information:  pt wants some medication for Covid he has no symptoms so far please advise thanks

## 2024-03-27 ENCOUNTER — LAB REQUISITION (OUTPATIENT)
Dept: LAB | Facility: HOSPITAL | Age: 84
End: 2024-03-27
Payer: MEDICARE

## 2024-03-27 DIAGNOSIS — N39.0 URINARY TRACT INFECTION, SITE NOT SPECIFIED: ICD-10-CM

## 2024-03-27 LAB
APPEARANCE UR: ABNORMAL
BACTERIA #/AREA URNS AUTO: ABNORMAL /HPF
BILIRUB UR QL STRIP.AUTO: NEGATIVE
COLOR UR AUTO: YELLOW
GLUCOSE UR QL STRIP.AUTO: NEGATIVE
HYALINE CASTS URNS QL MICRO: ABNORMAL /LPF
KETONES UR QL STRIP.AUTO: NEGATIVE
LEUKOCYTE ESTERASE UR QL STRIP.AUTO: ABNORMAL
NITRITE UR QL STRIP.AUTO: NEGATIVE
PH UR STRIP.AUTO: 6 [PH]
PROT UR QL STRIP.AUTO: 100
RBC #/AREA URNS AUTO: ABNORMAL /HPF
RBC UR QL AUTO: ABNORMAL
SP GR UR STRIP.AUTO: >=1.03 (ref 1–1.03)
SQUAMOUS #/AREA URNS AUTO: ABNORMAL /HPF
UROBILINOGEN UR STRIP-ACNC: 0.2
WBC #/AREA URNS AUTO: ABNORMAL /HPF

## 2024-03-27 PROCEDURE — 81003 URINALYSIS AUTO W/O SCOPE: CPT | Performed by: UROLOGY

## 2024-03-27 PROCEDURE — 87086 URINE CULTURE/COLONY COUNT: CPT | Performed by: UROLOGY

## 2024-03-30 LAB — BACTERIA UR CULT: ABNORMAL

## 2024-04-03 ENCOUNTER — TELEPHONE (OUTPATIENT)
Dept: INTERNAL MEDICINE | Facility: CLINIC | Age: 84
End: 2024-04-03
Payer: MEDICARE

## 2024-04-03 NOTE — TELEPHONE ENCOUNTER
----- Message from Aleta Brumfield sent at 4/3/2024 10:18 AM CDT -----  Regarding: medical advice  Type:  Needs Medical Advice    Who Called: [facility]     Would the patient rather a call back or a response via [facility]? C/b     Best Call Back Number: [phone]    Additional Information: [facility] is calling in to see if Dr. Montoya would approve for a  plan of care and Occupational therapy for pt. Please call to advise.

## 2024-04-03 NOTE — TELEPHONE ENCOUNTER
Spoke with Ms Eagle. Pt was referred originally by us, but ended up in the hospital. The hospital ist sent the referral again. Ms Eagle was wondering if they could switch the orders to Dr Montoya. I gave a verbal ok to switch to Dr Montoya, and when we get the proper faxed over paperwork, Dr Montoya will sign it. Shagufta aware.

## 2024-04-09 ENCOUNTER — TELEPHONE (OUTPATIENT)
Dept: INTERNAL MEDICINE | Facility: CLINIC | Age: 84
End: 2024-04-09
Payer: MEDICARE

## 2024-04-09 NOTE — TELEPHONE ENCOUNTER
----- Message from Sekou Joe sent at 4/9/2024 11:41 AM CDT -----  .Type:  Patient Returning Call    Who Called:pt's wife   Who Left Message for Patient:unknown  Does the patient know what this is regarding?:  Would the patient rather a call back or a response via [facility]? Call back  Best Call Back Number:[phone]  Additional Information: states returning call nothing listed in chart

## 2024-04-09 NOTE — TELEPHONE ENCOUNTER
----- Message from Sekou Joe sent at 4/9/2024 11:41 AM CDT -----  .Type:  Patient Returning Call    Who Called:pt's wife   Who Left Message for Patient:unknown  Does the patient know what this is regarding?:  Would the patient rather a call back or a response via [facility]? Call back  Best Call Back Number:[phone]  Additional Information: states returning call nothing listed in chart

## 2024-04-09 NOTE — TELEPHONE ENCOUNTER
Patients wife said she was going to have to cancel appt's. He is just getting out of the hospital and can't make office visit. She will call back to schedule follow up. Was inpatient at [facility]

## 2024-04-11 ENCOUNTER — TELEPHONE (OUTPATIENT)
Dept: INTERNAL MEDICINE | Facility: CLINIC | Age: 84
End: 2024-04-11
Payer: MEDICARE

## 2024-04-15 ENCOUNTER — TELEPHONE (OUTPATIENT)
Dept: INTERNAL MEDICINE | Facility: CLINIC | Age: 84
End: 2024-04-15
Payer: MEDICARE

## 2024-04-15 ENCOUNTER — DOCUMENT SCAN (OUTPATIENT)
Dept: HOME HEALTH SERVICES | Facility: HOSPITAL | Age: 84
End: 2024-04-15
Payer: MEDICARE

## 2024-04-15 NOTE — TELEPHONE ENCOUNTER
Spoke with home health and she was inquiring about magan lift and swing according to notes pt needs a visit can be virtual since pt is unable to get out of bed. Wife would like directions on how to set pt up for virtual visit. Would you be able to contact pt wife to get set up with pt portal for pt and possibly schedule virtual visit.

## 2024-04-15 NOTE — TELEPHONE ENCOUNTER
----- Message from Sekou Diaz sent at 4/15/2024 11:22 AM CDT -----  .Type:  Needs Medical Advice    Who Called: [facility]  Symptoms (please be specific):    How long has patient had these symptoms:    Pharmacy name and phone #:    Would the patient rather a call back or a response via [facility]? Call back  Best Call Back Number: [phone]  Additional Information: called wanting confirmation on clinics faxed to office

## 2024-04-17 PROCEDURE — G0179 MD RECERTIFICATION HHA PT: HCPCS | Mod: ,,, | Performed by: INTERNAL MEDICINE

## 2024-04-19 ENCOUNTER — EXTERNAL HOME HEALTH (OUTPATIENT)
Dept: HOME HEALTH SERVICES | Facility: HOSPITAL | Age: 84
End: 2024-04-19
Payer: MEDICARE

## 2024-04-22 ENCOUNTER — DOCUMENT SCAN (OUTPATIENT)
Dept: HOME HEALTH SERVICES | Facility: HOSPITAL | Age: 84
End: 2024-04-22
Payer: MEDICARE

## 2024-04-22 ENCOUNTER — TELEPHONE (OUTPATIENT)
Dept: INTERNAL MEDICINE | Facility: CLINIC | Age: 84
End: 2024-04-22
Payer: MEDICARE

## 2024-04-22 NOTE — TELEPHONE ENCOUNTER
----- Message from Mago Lobo sent at 4/22/2024 10:44 AM CDT -----  .Type:  Patient Returning Call    Who Called[facility]   Who Left Message for Patient:Shagufta  Does the patient know what this is regarding?:Medical Supplies  Would the patient rather a call back or a response via [facility]?   Best Call Back Number:[phone]  Additional Information: Please call back about medical supplies

## 2024-04-23 ENCOUNTER — DOCUMENT SCAN (OUTPATIENT)
Dept: HOME HEALTH SERVICES | Facility: HOSPITAL | Age: 84
End: 2024-04-23
Payer: MEDICARE

## 2024-04-26 ENCOUNTER — EXTERNAL HOME HEALTH (OUTPATIENT)
Dept: HOME HEALTH SERVICES | Facility: HOSPITAL | Age: 84
End: 2024-04-26
Payer: MEDICARE

## 2024-05-01 ENCOUNTER — TELEPHONE (OUTPATIENT)
Dept: INTERNAL MEDICINE | Facility: CLINIC | Age: 84
End: 2024-05-01
Payer: MEDICARE

## 2024-05-01 DIAGNOSIS — S32.018A OTHER CLOSED FRACTURE OF FIRST LUMBAR VERTEBRA, INITIAL ENCOUNTER: ICD-10-CM

## 2024-05-01 DIAGNOSIS — F03.B0 MODERATE DEMENTIA WITHOUT BEHAVIORAL DISTURBANCE, PSYCHOTIC DISTURBANCE, MOOD DISTURBANCE, OR ANXIETY, UNSPECIFIED DEMENTIA TYPE: Primary | ICD-10-CM

## 2024-05-01 DIAGNOSIS — R53.1 WEAKNESS: ICD-10-CM

## 2024-05-01 NOTE — TELEPHONE ENCOUNTER
----- Message from Mago Lobo sent at 5/1/2024  4:17 PM CDT -----  .Type:  Patient Returning Call    Who Called:Perla  Who Left Message for Patient:Wife  Does the patient know what this is regarding?:lift  Would the patient rather a call back or a response via [facility]?   Best Call Back Number:[phone]  Additional Information: Please call back about getting the patient a magan lift

## 2024-05-01 NOTE — TELEPHONE ENCOUNTER
Spoke with Ms Perla, Pt Occupational Therapist is requesting a Brandi lift, will order the Brandi lift. Pt may require a visit to get the lift approved.   Faxed order to [facility].

## 2024-05-07 ENCOUNTER — TELEPHONE (OUTPATIENT)
Dept: INTERNAL MEDICINE | Facility: CLINIC | Age: 84
End: 2024-05-07
Payer: MEDICARE

## 2024-05-07 DIAGNOSIS — R53.1 WEAKNESS: Primary | ICD-10-CM

## 2024-05-07 NOTE — TELEPHONE ENCOUNTER
Pt wife requesting Physical therapy at [facility] due to weakness since being discharged from hospital, does pt need appointment or would you like me to send referral. Please Advise...

## 2024-05-07 NOTE — TELEPHONE ENCOUNTER
----- Message from Salina Patton sent at 5/7/2024  3:57 PM CDT -----  .Who Called: Asael Ramos          Patient's Preferred Phone Number on File: [phone]   Best Call Back Number, if different:  Additional Information: pt wife asking to put pt in rehab do he need a face to face appt or can it be virtual

## 2024-05-18 ENCOUNTER — DOCUMENT SCAN (OUTPATIENT)
Dept: HOME HEALTH SERVICES | Facility: HOSPITAL | Age: 84
End: 2024-05-18
Payer: MEDICARE

## 2024-05-22 ENCOUNTER — HOSPITAL ENCOUNTER (EMERGENCY)
Facility: HOSPITAL | Age: 84
Discharge: HOME | End: 2024-05-22
Attending: STUDENT IN AN ORGANIZED HEALTH CARE EDUCATION/TRAINING PROGRAM
Payer: MEDICARE

## 2024-05-22 VITALS
SYSTOLIC BLOOD PRESSURE: 98 MMHG | DIASTOLIC BLOOD PRESSURE: 70 MMHG | HEART RATE: 90 BPM | OXYGEN SATURATION: 94 % | BODY MASS INDEX: 24.65 KG/M2 | WEIGHT: 182 LBS | HEIGHT: 72 IN | RESPIRATION RATE: 24 BRPM | TEMPERATURE: 98 F

## 2024-05-22 DIAGNOSIS — R31.9 HEMATURIA, UNSPECIFIED TYPE: Primary | ICD-10-CM

## 2024-05-22 LAB
ALBUMIN SERPL-MCNC: 3.3 G/DL (ref 3.4–4.8)
ALBUMIN/GLOB SERPL: 0.8 RATIO (ref 1.1–2)
ALP SERPL-CCNC: 150 UNIT/L (ref 40–150)
ALT SERPL-CCNC: 18 UNIT/L (ref 0–55)
ANION GAP SERPL CALC-SCNC: 12 MEQ/L
APTT PPP: 30.3 SECONDS (ref 23.2–33.7)
AST SERPL-CCNC: 24 UNIT/L (ref 5–34)
BACTERIA #/AREA URNS AUTO: ABNORMAL /HPF
BASOPHILS # BLD AUTO: 0.02 X10(3)/MCL
BASOPHILS NFR BLD AUTO: 0.1 %
BILIRUB SERPL-MCNC: 1.2 MG/DL
BILIRUB UR QL STRIP.AUTO: NEGATIVE
BUN SERPL-MCNC: 30.2 MG/DL (ref 8.4–25.7)
CALCIUM SERPL-MCNC: 9.5 MG/DL (ref 8.8–10)
CHLORIDE SERPL-SCNC: 113 MMOL/L (ref 98–107)
CLARITY UR: ABNORMAL
CO2 SERPL-SCNC: 18 MMOL/L (ref 23–31)
COLOR UR AUTO: ABNORMAL
CREAT SERPL-MCNC: 1.27 MG/DL (ref 0.73–1.18)
CREAT/UREA NIT SERPL: 24
EOSINOPHIL # BLD AUTO: 0 X10(3)/MCL (ref 0–0.9)
EOSINOPHIL NFR BLD AUTO: 0 %
ERYTHROCYTE [DISTWIDTH] IN BLOOD BY AUTOMATED COUNT: 16.9 % (ref 11.5–17)
GFR SERPLBLD CREATININE-BSD FMLA CKD-EPI: 55 ML/MIN/1.73/M2
GLOBULIN SER-MCNC: 3.9 GM/DL (ref 2.4–3.5)
GLUCOSE SERPL-MCNC: 172 MG/DL (ref 82–115)
GLUCOSE UR QL STRIP: NORMAL
HCT VFR BLD AUTO: 43.8 % (ref 42–52)
HGB BLD-MCNC: 14.8 G/DL (ref 14–18)
HGB UR QL STRIP: ABNORMAL
IMM GRANULOCYTES # BLD AUTO: 0.06 X10(3)/MCL (ref 0–0.04)
IMM GRANULOCYTES NFR BLD AUTO: 0.4 %
INR PPP: 1.2
KETONES UR QL STRIP: NEGATIVE
LEUKOCYTE ESTERASE UR QL STRIP: 500
LYMPHOCYTES # BLD AUTO: 0.99 X10(3)/MCL (ref 0.6–4.6)
LYMPHOCYTES NFR BLD AUTO: 6.9 %
MCH RBC QN AUTO: 31.8 PG (ref 27–31)
MCHC RBC AUTO-ENTMCNC: 33.8 G/DL (ref 33–36)
MCV RBC AUTO: 94 FL (ref 80–94)
MONOCYTES # BLD AUTO: 0.65 X10(3)/MCL (ref 0.1–1.3)
MONOCYTES NFR BLD AUTO: 4.5 %
MUCOUS THREADS URNS QL MICRO: ABNORMAL /LPF
NEUTROPHILS # BLD AUTO: 12.65 X10(3)/MCL (ref 2.1–9.2)
NEUTROPHILS NFR BLD AUTO: 88.1 %
NITRITE UR QL STRIP: NEGATIVE
NRBC BLD AUTO-RTO: 0 %
PH UR STRIP: 6.5 [PH]
PLATELET # BLD AUTO: 196 X10(3)/MCL (ref 130–400)
PMV BLD AUTO: 9.4 FL (ref 7.4–10.4)
POTASSIUM SERPL-SCNC: 4.1 MMOL/L (ref 3.5–5.1)
PROT SERPL-MCNC: 7.2 GM/DL (ref 5.8–7.6)
PROT UR QL STRIP: ABNORMAL
PROTHROMBIN TIME: 15.2 SECONDS (ref 12.5–14.5)
RBC # BLD AUTO: 4.66 X10(6)/MCL (ref 4.7–6.1)
RBC #/AREA URNS AUTO: >100 /HPF
SODIUM SERPL-SCNC: 143 MMOL/L (ref 136–145)
SP GR UR STRIP.AUTO: 1.02 (ref 1–1.03)
SQUAMOUS #/AREA URNS LPF: ABNORMAL /HPF
UROBILINOGEN UR STRIP-ACNC: NORMAL
WBC # SPEC AUTO: 14.37 X10(3)/MCL (ref 4.5–11.5)
WBC #/AREA URNS AUTO: >100 /HPF
WBC CLUMPS UR QL AUTO: ABNORMAL

## 2024-05-22 PROCEDURE — 87086 URINE CULTURE/COLONY COUNT: CPT | Performed by: PHYSICIAN ASSISTANT

## 2024-05-22 PROCEDURE — 81001 URINALYSIS AUTO W/SCOPE: CPT | Performed by: PHYSICIAN ASSISTANT

## 2024-05-22 PROCEDURE — 85730 THROMBOPLASTIN TIME PARTIAL: CPT | Performed by: PHYSICIAN ASSISTANT

## 2024-05-22 PROCEDURE — 25000003 PHARM REV CODE 250: Performed by: STUDENT IN AN ORGANIZED HEALTH CARE EDUCATION/TRAINING PROGRAM

## 2024-05-22 PROCEDURE — 80053 COMPREHEN METABOLIC PANEL: CPT | Performed by: PHYSICIAN ASSISTANT

## 2024-05-22 PROCEDURE — 85610 PROTHROMBIN TIME: CPT | Performed by: PHYSICIAN ASSISTANT

## 2024-05-22 PROCEDURE — 99283 EMERGENCY DEPT VISIT LOW MDM: CPT

## 2024-05-22 PROCEDURE — 85025 COMPLETE CBC W/AUTO DIFF WBC: CPT | Performed by: PHYSICIAN ASSISTANT

## 2024-05-22 RX ORDER — LIDOCAINE HYDROCHLORIDE 20 MG/ML
JELLY TOPICAL
Status: COMPLETED | OUTPATIENT
Start: 2024-05-22 | End: 2024-05-22

## 2024-05-22 RX ORDER — LIDOCAINE HYDROCHLORIDE 20 MG/ML
JELLY TOPICAL
Qty: 30 ML | Refills: 0 | Status: SHIPPED | OUTPATIENT
Start: 2024-05-22

## 2024-05-22 RX ADMIN — LIDOCAINE HYDROCHLORIDE: 20 JELLY TOPICAL at 06:05

## 2024-05-22 NOTE — ED PROVIDER NOTES
"Encounter Date: 5/22/2024    SCRIBE #1 NOTE: I, Milad Horton, am scribing for, and in the presence of,  Julian Borden MD. I have scribed the following portions of the note - Other sections scribed: HPI, ROS, PE.       History     Chief Complaint   Patient presents with    Hematuria     Presents via [facility] for hematuria. Family removed catheter last night due to patient being uncomfortable, and since has been having hematuria. +Eliquis. Baseline GCS 14, currently 13.      83 year old male with a pmhx of A-fib, a concussion, CAD, HLD, shingles, and malignant melanoma of the skin presents to the ED for hematuria onset last night.  The patient's wife reports that his home health nurse took his mendieta catheter out last night because he "did not need it anymore and was uncomfortable."  She reports that the patient had intense pain while they removed it, as well as bleeding.  She reports that since then, the patient has been having hematuria.  The patient denies difficulty urinating.  The patient's wife reports that he had his mendieta catheter since going to [facility] ED.  Per chart review, the patient had his mendieta catheter placed on 2/9/24 after suspecting a UTI.  The patient's wife reports that he has become increasingly confused since being at [facility].  The patient's wife reports that he is on Eliquis, stating that she halted his medication yesterday morning. She reports that [facility] had been in touch with Dr. Pino Florez, but that he has not seen the patient in person.  She reports that the patient's PCP is Dr. Montoya.    The history is provided by the patient, the spouse and medical records. No  was used.     Review of patient's allergies indicates:  No Known Allergies  Past Medical History:   Diagnosis Date    A-fib     Brain concussion     CAD (coronary artery disease)     Malignant melanoma of skin, unspecified     Mild cognitive impairment     Mixed hyperlipidemia     Pulmonary " hypertension     Shingles     Sleep apnea, unspecified      Past Surgical History:   Procedure Laterality Date    CATARACT EXTRACTION      CORONARY STENT PLACEMENT      HIP REPLACEMENT ARTHROPLASTY      TOTAL SHOULDER ARTHROPLASTY Bilateral      Family History   Problem Relation Name Age of Onset    Breast cancer Mother      Cancer Brother       Social History     Tobacco Use    Smoking status: Never    Smokeless tobacco: Never   Substance Use Topics    Alcohol use: Yes     Alcohol/week: 2.0 standard drinks of alcohol     Types: 1 Glasses of wine, 1 Cans of beer per week     Review of Systems   Genitourinary:  Positive for hematuria. Negative for difficulty urinating.   Psychiatric/Behavioral:  Positive for confusion.        Physical Exam     Initial Vitals [05/22/24 1702]   BP Pulse Resp Temp SpO2   122/70 90 (!) 24 97.9 °F (36.6 °C) 95 %      MAP       --         Physical Exam    Constitutional: He appears well-developed and well-nourished. He is not diaphoretic. No distress.   HENT:   Head: Normocephalic and atraumatic.   Right Ear: External ear normal.   Left Ear: External ear normal.   Nose: Nose normal.   Eyes: EOM are normal. Pupils are equal, round, and reactive to light. Right eye exhibits no discharge. Left eye exhibits no discharge.   Cardiovascular:  An irregularly irregular rhythm present.   Tachycardia present.   Exam reveals no gallop and no friction rub.       No murmur heard.  Pulmonary/Chest: Breath sounds normal. No respiratory distress. He has no wheezes. He has no rhonchi. He has no rales. He exhibits no tenderness.   Abdominal: Bowel sounds are normal. There is no abdominal tenderness.   Firm and palpable mass There is no rebound and no guarding.   Musculoskeletal:         General: No edema. Normal range of motion.     Neurological: He is alert and oriented to person, place, and time. No cranial nerve deficit or sensory deficit.   Averbal chronically since last discharge    Skin: Skin is warm  and dry. Capillary refill takes less than 2 seconds.         ED Course   Procedures  Labs Reviewed   COMPREHENSIVE METABOLIC PANEL - Abnormal; Notable for the following components:       Result Value    Chloride 113 (*)     CO2 18 (*)     Glucose 172 (*)     Blood Urea Nitrogen 30.2 (*)     Creatinine 1.27 (*)     Albumin 3.3 (*)     Globulin 3.9 (*)     Albumin/Globulin Ratio 0.8 (*)     All other components within normal limits   PROTIME-INR - Abnormal; Notable for the following components:    PT 15.2 (*)     All other components within normal limits   URINALYSIS, REFLEX TO URINE CULTURE - Abnormal; Notable for the following components:    Color, UA Light-Orange (*)     Appearance, UA Turbid (*)     Protein, UA 2+ (*)     Blood, UA 3+ (*)     Leukocyte Esterase,  (*)     WBC, UA >100 (*)     WBC Clumps, UA Many (*)     Mucous, UA Trace (*)     RBC, UA >100 (*)     All other components within normal limits   CBC WITH DIFFERENTIAL - Abnormal; Notable for the following components:    WBC 14.37 (*)     RBC 4.66 (*)     MCH 31.8 (*)     Neut # 12.65 (*)     IG# 0.06 (*)     All other components within normal limits   APTT - Normal   CULTURE, URINE   CBC W/ AUTO DIFFERENTIAL    Narrative:     The following orders were created for panel order CBC auto differential.  Procedure                               Abnormality         Status                     ---------                               -----------         ------                     CBC with Differential[[id number]]       Abnormal            Final result                 Please view results for these tests on the individual orders.          Imaging Results    None          Medications   LIDOcaine HCl 2% urojet ( Mucous Membrane Given 5/22/24 1815)     Medical Decision Making  The differential diagnoses includes but is not limited to:  Hematuria, acute cystitis renal mass    Please see ED course for MDM but in short patient was hematuria of unsure etiology.  On  "Eliquis.  Mendieta catheter placed in irrigation performed here in the emergency department.  Patient tolerated the procedure well had some urinary retention prior to Mendieta but this is resolved.  Urine is now clear.  They are requesting discharge.  Patient appears to be awake and alert.  Wife GCS 15.  Capacity.  Suitable for discharge at this time    Amount and/or Complexity of Data Reviewed  Independent Historian: spouse     Details: The patient's wife reports that his home health nurse took his mendieta catheter out last night because he "did not need it anymore."  She reports that the patient had intense pain while they removed it, as well as bleeding.  She reports that since then, the patient has been having hematuria.  The patient's wife reports that he had his mendieta catheter since going to [facility] ED.  Per chart review, the patient had his mendieta catheter placed on 2/9/24 after suspecting a UTI.   The patient's wife reports that he has become increasingly confused since being at [facility]. The patient's wife reports that he is on Eliquis, stating that she halted his medication yesterday morning. She reports that [facility] had been in touch with Dr. Pino Florez, but that he has not seen the patient in person.   She reports that the patient's PCP is Dr. Montoya.  External Data Reviewed: notes.     Details: See ED course  Labs: ordered. Decision-making details documented in ED Course.    Risk  OTC drugs.  Prescription drug management.            Scribe Attestation:   Scribe #1: I performed the above scribed service and the documentation accurately describes the services I performed. I attest to the accuracy of the note.    Attending Attestation:           Physician Attestation for Scribe:  Physician Attestation Statement for Scribe #1: I, Julian Borden MD, reviewed documentation, as scribed by Milad Horton in my presence, and it is both accurate and complete.             ED Course as of 05/22/24 2212 "   Wed May 22, 2024   1806 Comprehensive metabolic panel(!)  Small bump in creatinine.  No other acute changes noted. [MM]   1938 Chart review reveals discharge summary from 04/10/2024.  Patient was admitted for altered mental status.  He was felt to have a possible UTI along with the ANDREI and metabolic acidosis.  Did have some urinary retention, hematuria.  Mendieta was placed 02/09/2024.  Had some continuing mild confusion at discharge with a this was possibly due to underlying dementia per note.  Discharged with Mendieta.  02/16/2024. [MM]   1938 Urinalysis, Reflex to Urine Culture(!)  Hematuria no obvious infection. [MM]   1938 Comprehensive metabolic panel(!)  No significant electrolyte abnormality or renal dysfunction. [MM]   1938 CBC auto differential(!)  Mild leukocytosis.  No anemia. [MM]   1938 PTT: 30.3 [MM]   1938 INR: 1.2 [MM]   2026 Patient's urine is now clear after irrigation.  Offered admission for further evaluation management, urology however she declines.  Neither patient and wife wants to stay in hospital tonight.  We will seek discharge at this time.  Referral to Urology place.  Strongly encouraged to follow up with the urologist. Return precautions given.  Questions invited, questions answered to the best my ability.  Patient discharged home condition stable.   [MM]      ED Course User Index  [MM] Julian Borden MD                             Clinical Impression:  Final diagnoses:  [R31.9] Hematuria, unspecified type (Primary)          ED Disposition Condition    Discharge Stable          ED Prescriptions       Medication Sig Dispense Start Date End Date Auth. Provider    LIDOcaine HCL 2% (XYLOCAINE) 2 % jelly Apply topically as needed. Apply for mendieta catheters 30 mL 5/22/2024 -- Julian Borden MD          Follow-up Information       Follow up With Specialties Details Why Contact Info    Aline Monotya MD Internal Medicine Call   [address] [place] [id number] [phone]       Balta Sanford MD Urology Call   [address]  [facility] [address]  [phone]               Julian Borden MD  05/22/24 9930

## 2024-05-23 ENCOUNTER — PATIENT OUTREACH (OUTPATIENT)
Dept: EMERGENCY MEDICINE | Facility: HOSPITAL | Age: 84
End: 2024-05-23
Payer: MEDICARE

## 2024-05-23 NOTE — DISCHARGE INSTRUCTIONS
Thanks for letting us take care of you today!  It is our goal to give you courteous care and to keep you comfortable and informed, if you have any questions before you leave I will be happy to try and answer them.    Here is some advice after your visit:    Your visit in the emergency department is NOT definitive care - please follow-up with your primary care doctor and/or specialist within 1-2 days. Please return to the emergency department if you develop worsening symptoms including: fever, chills, chest pain, shortness of breath, weakness, numbness, tingling, nausea, vomiting, inability to eat, drink, or take your medication. Please return if you have any worsening in your condition or if you have any other concerns.    If you had radiology exams like an XRAY or CT in the emergency Department the interpreation on them may be preliminary - there may be less time sensitive findings on the reports please obtain these reports within 24 hours from the hospital or by using your out on your mobile phone to access records.  Bring these to your primary care doctor and/or specialist for further review of incidental findings.    Please review any LAB WORK from your visit today with your primary care physician.    Please be sure to follow up with Urology.  Come information provided.  Referral has been placed.  You are welcome to return any time for any worsening symptoms.  Please continue to hold your Eliquis.

## 2024-05-23 NOTE — PROGRESS NOTES
Patient's wife Perla stated she will reach out to her 's Urologist today. Ms Perla stated they would wait to follow up with PCP on 6/26/24 at 1:40 pm, patient will receive an appointment reminder.The patient is agreeable to a follow-up call the week of 6/14/24.

## 2024-05-24 ENCOUNTER — DOCUMENT SCAN (OUTPATIENT)
Dept: HOME HEALTH SERVICES | Facility: HOSPITAL | Age: 84
End: 2024-05-24
Payer: MEDICARE

## 2024-05-24 ENCOUNTER — TELEPHONE (OUTPATIENT)
Dept: INTERNAL MEDICINE | Facility: CLINIC | Age: 84
End: 2024-05-24

## 2024-05-24 NOTE — TELEPHONE ENCOUNTER
----- Message from Checo Oliva sent at 5/24/2024  9:19 AM CDT -----  Type:  Needs Medical Advice    Who Called: Sapphire fernandez  foot care nurse   Symptoms (please be specific):    How long has patient had these symptoms:    Pharmacy name and phone #:    Would the patient rather a call back or a response via [facility]?   Best Call Back Number: [phone]    Additional Information: called to follow up on order faxed over  for foot care request, would like to see pt today, please follow up,    Orders sent twice to [facility]

## 2024-05-25 LAB — BACTERIA UR CULT: ABNORMAL

## 2024-05-29 ENCOUNTER — DOCUMENT SCAN (OUTPATIENT)
Dept: HOME HEALTH SERVICES | Facility: HOSPITAL | Age: 84
End: 2024-05-29
Payer: MEDICARE

## 2024-06-05 ENCOUNTER — DOCUMENT SCAN (OUTPATIENT)
Dept: HOME HEALTH SERVICES | Facility: HOSPITAL | Age: 84
End: 2024-06-05
Payer: MEDICARE

## 2024-06-12 DIAGNOSIS — Z13.0 SCREENING FOR ENDOCRINE, NUTRITIONAL, METABOLIC AND IMMUNITY DISORDER: ICD-10-CM

## 2024-06-12 DIAGNOSIS — Z13.83 SCREENING FOR CARDIOVASCULAR, RESPIRATORY, AND GENITOURINARY DISEASES: ICD-10-CM

## 2024-06-12 DIAGNOSIS — Z13.29 SCREENING FOR ENDOCRINE, NUTRITIONAL, METABOLIC AND IMMUNITY DISORDER: ICD-10-CM

## 2024-06-12 DIAGNOSIS — Z13.89 SCREENING FOR CARDIOVASCULAR, RESPIRATORY, AND GENITOURINARY DISEASES: ICD-10-CM

## 2024-06-12 DIAGNOSIS — E78.2 MIXED HYPERLIPIDEMIA: ICD-10-CM

## 2024-06-12 DIAGNOSIS — Z79.899 ENCOUNTER FOR LONG-TERM (CURRENT) USE OF MEDICATIONS: ICD-10-CM

## 2024-06-12 DIAGNOSIS — Z12.5 SCREENING FOR PROSTATE CANCER: ICD-10-CM

## 2024-06-12 DIAGNOSIS — Z13.228 SCREENING FOR ENDOCRINE, NUTRITIONAL, METABOLIC AND IMMUNITY DISORDER: ICD-10-CM

## 2024-06-12 DIAGNOSIS — Z13.6 SCREENING FOR CARDIOVASCULAR, RESPIRATORY, AND GENITOURINARY DISEASES: ICD-10-CM

## 2024-06-12 DIAGNOSIS — I10 HYPERTENSION, UNSPECIFIED TYPE: Primary | ICD-10-CM

## 2024-06-12 DIAGNOSIS — Z13.21 SCREENING FOR ENDOCRINE, NUTRITIONAL, METABOLIC AND IMMUNITY DISORDER: ICD-10-CM

## 2024-06-14 ENCOUNTER — PATIENT OUTREACH (OUTPATIENT)
Dept: EMERGENCY MEDICINE | Facility: HOSPITAL | Age: 84
End: 2024-06-14
Payer: MEDICARE

## 2024-06-14 NOTE — PROGRESS NOTES
Reached out patient and his wife Perla to follow up from his ER visit in May. Patient is scheduled to follow up with Dr Sanderson on 6/26/24 at 1:40 pm, patient will receive an appointment reminder. I asked patient and his wife Perla to call me back if they required any further assistance.

## 2024-06-25 ENCOUNTER — PATIENT OUTREACH (OUTPATIENT)
Dept: EMERGENCY MEDICINE | Facility: HOSPITAL | Age: 84
End: 2024-06-25
Payer: MEDICARE

## 2024-06-25 ENCOUNTER — EXTERNAL HOME HEALTH (OUTPATIENT)
Dept: HOME HEALTH SERVICES | Facility: HOSPITAL | Age: 84
End: 2024-06-25
Payer: MEDICARE

## 2024-06-25 NOTE — PROGRESS NOTES
ED navigator reminded patient about appointment for Wednesday (6/26/24) with Dr Montoya through voicemail, as they did not answer the call. Per chart patient cancelled their follow up, I left a message to call back for scheduling assistance. ED navigator to close encounter at this time.

## 2024-07-01 ENCOUNTER — DOCUMENT SCAN (OUTPATIENT)
Dept: HOME HEALTH SERVICES | Facility: HOSPITAL | Age: 84
End: 2024-07-01
Payer: MEDICARE

## 2024-07-02 ENCOUNTER — DOCUMENT SCAN (OUTPATIENT)
Dept: HOME HEALTH SERVICES | Facility: HOSPITAL | Age: 84
End: 2024-07-02
Payer: MEDICARE

## 2024-07-08 ENCOUNTER — DOCUMENT SCAN (OUTPATIENT)
Dept: HOME HEALTH SERVICES | Facility: HOSPITAL | Age: 84
End: 2024-07-08
Payer: MEDICARE

## 2024-07-09 ENCOUNTER — DOCUMENT SCAN (OUTPATIENT)
Dept: HOME HEALTH SERVICES | Facility: HOSPITAL | Age: 84
End: 2024-07-09
Payer: MEDICARE

## 2024-08-13 ENCOUNTER — DOCUMENT SCAN (OUTPATIENT)
Dept: HOME HEALTH SERVICES | Facility: HOSPITAL | Age: 84
End: 2024-08-13
Payer: MEDICARE

## 2024-08-15 ENCOUNTER — TELEPHONE (OUTPATIENT)
Dept: INTERNAL MEDICINE | Facility: CLINIC | Age: 84
End: 2024-08-15
Payer: MEDICARE

## 2024-08-15 NOTE — TELEPHONE ENCOUNTER
Spoke with Pt wife, will call once we get a response from Dr Montoya  Ms Perla expressed understanding

## 2024-08-15 NOTE — TELEPHONE ENCOUNTER
----- Message from Mary Grace Senegal sent at 8/15/2024  2:35 PM CDT -----  .Type:  Needs Medical Advice    Who Called:  pt's wife ( Perla)    Symptoms (please be specific):  no     How long has patient had these symptoms:   no    Pharmacy name and phone #:   no    Would the patient rather a call back or a response via [facility]?      Best Call Back Number:  [phone]    Additional Information:  pt no longer has home health and now Hospice states they can take over please advise on what they can do thanks

## 2024-08-15 NOTE — TELEPHONE ENCOUNTER
----- Message from Checo Oliva sent at 8/15/2024  3:17 PM CDT -----    Who Called: Mrs. Ramos    Caller is requesting assistance/information from provider's office.    Symptoms (please be specific):    How long has patient had these symptoms:    List of preferred pharmacies on file (remove unneeded): [unfilled]  If different, enter pharmacy into here including location and phone number:       Patient's Preferred Phone Number on File: [phone]   Best Call Back Number, if different:  Additional Information:  Pt wife stated home health is running out , received a call from William with  [facility] , to discuss hospice. Please follow up with pt wife to further discuss.      .

## 2024-08-15 NOTE — TELEPHONE ENCOUNTER
----- Message from Mary Grace Senegal sent at 8/15/2024  2:35 PM CDT -----  .Type:  Needs Medical Advice    Who Called:  pt's wife ( Perla)    Symptoms (please be specific):  no     How long has patient had these symptoms:   no    Pharmacy name and phone #:   no    Would the patient rather a call back or a response via [facility]?      Best Call Back Number:  [phone]    Additional Information:  pt no longer has home health and now Hospice states they can take over please advise on what they can do thanks

## 2024-08-16 NOTE — TELEPHONE ENCOUNTER
Per Dr Montoya  Patient needs a visit.  Can not make any recommendations before that  Last visit was in June of 2023

## 2024-08-16 NOTE — TELEPHONE ENCOUNTER
Spoke with Ms Duncan, Ms Duncan was informed we would need a OV/Telemed visit before a referral could be sent out. I explained that Mr Vyas had not been seen since 6-21-23, we have no Dx in Pt chart that would work for a Hospice referral. Ms Duncan understood. Monica has sent over a text with a link for the portal to Ms Levy phone. Monica will call Ms Duncan to get Mr Vyas on the schedule.  Ms Duncan is aware.

## 2024-08-19 PROBLEM — K21.9 GERD (GASTROESOPHAGEAL REFLUX DISEASE): Chronic | Status: ACTIVE | Noted: 2024-08-19

## 2024-08-19 PROBLEM — I25.10 CAD (CORONARY ARTERY DISEASE): Chronic | Status: ACTIVE | Noted: 2024-08-19

## 2024-08-19 PROBLEM — I48.91 ATRIAL FIBRILLATION: Status: ACTIVE | Noted: 2019-10-08

## 2024-08-19 PROBLEM — K21.9 GERD (GASTROESOPHAGEAL REFLUX DISEASE): Status: ACTIVE | Noted: 2024-08-19

## 2024-08-19 PROBLEM — C43.9 MALIGNANT MELANOMA OF SKIN, UNSPECIFIED: Status: ACTIVE | Noted: 2024-08-19

## 2024-08-19 PROBLEM — E78.5 HYPERLIPIDEMIA: Status: ACTIVE | Noted: 2024-08-19

## 2024-08-19 PROBLEM — I48.91 A-FIB: Chronic | Status: ACTIVE | Noted: 2019-10-08

## 2024-08-19 PROBLEM — G47.30 SLEEP APNEA, UNSPECIFIED: Chronic | Status: ACTIVE | Noted: 2024-08-19

## 2024-08-19 PROBLEM — I25.10 ARTERIOSCLEROSIS OF CORONARY ARTERY: Status: ACTIVE | Noted: 2024-08-19

## 2024-08-19 PROBLEM — C43.9 MALIGNANT MELANOMA OF SKIN, UNSPECIFIED: Chronic | Status: ACTIVE | Noted: 2024-08-19

## 2024-08-19 PROBLEM — E78.2 MIXED HYPERLIPIDEMIA: Chronic | Status: ACTIVE | Noted: 2024-08-19

## 2024-08-19 PROBLEM — G47.30 SLEEP APNEA: Status: ACTIVE | Noted: 2024-08-19

## 2024-08-19 PROBLEM — E78.2 MIXED HYPERLIPIDEMIA: Status: ACTIVE | Noted: 2024-08-19

## 2024-08-26 ENCOUNTER — OFFICE VISIT (OUTPATIENT)
Dept: INTERNAL MEDICINE | Facility: CLINIC | Age: 84
End: 2024-08-26
Payer: MEDICARE

## 2024-08-26 VITALS — HEIGHT: 72 IN | WEIGHT: 185 LBS | BODY MASS INDEX: 25.06 KG/M2

## 2024-08-26 DIAGNOSIS — C43.9 MALIGNANT MELANOMA OF SKIN, UNSPECIFIED: Chronic | ICD-10-CM

## 2024-08-26 DIAGNOSIS — F03.B18 MODERATE DEMENTIA WITH OTHER BEHAVIORAL DISTURBANCE, UNSPECIFIED DEMENTIA TYPE: ICD-10-CM

## 2024-08-26 DIAGNOSIS — I48.0 PAROXYSMAL ATRIAL FIBRILLATION: Chronic | ICD-10-CM

## 2024-08-26 DIAGNOSIS — R62.7 FAILURE TO THRIVE IN ADULT: Primary | ICD-10-CM

## 2024-08-26 PROCEDURE — 1160F RVW MEDS BY RX/DR IN RCRD: CPT | Mod: CPTII,95,,

## 2024-08-26 PROCEDURE — 3288F FALL RISK ASSESSMENT DOCD: CPT | Mod: CPTII,95,,

## 2024-08-26 PROCEDURE — 1159F MED LIST DOCD IN RCRD: CPT | Mod: CPTII,95,,

## 2024-08-26 PROCEDURE — 1101F PT FALLS ASSESS-DOCD LE1/YR: CPT | Mod: CPTII,95,,

## 2024-08-26 PROCEDURE — 1126F AMNT PAIN NOTED NONE PRSNT: CPT | Mod: CPTII,95,,

## 2024-08-26 PROCEDURE — 99213 OFFICE O/P EST LOW 20 MIN: CPT | Mod: 95,,,

## 2024-08-26 NOTE — PROGRESS NOTES
Patient ID: Asael Ramos is a 84 y.o. male.    Chief Complaint: Referral (Referral for [facility] )      The patient location is: home  Visit type: audiovisual    Face to Face time with patient: 15 minutes    15 minutes of total time spent on the encounter, which includes face to face time and non-face to face time preparing to see the patient (eg, review of tests), Obtaining and/or reviewing separately obtained history, Documenting clinical information in the electronic or other health record, Independently interpreting results (not separately reported) and communicating results to the patient/family/caregiver, or Care coordination (not separately reported).     Each patient to whom he or she provides medical services by telemedicine is:  (1) informed of the relationship between the physician and patient and the respective role of any other health care provider with respect to management of the patient; and (2) notified that he or she may decline to receive medical services by telemedicine and may withdraw from such care at any time.      Asael Ramos is a 83 y.o. male, known to Dr Montoya, presents today for a requested visit.  Medical comorbidities include CAD, AFib (Eliquis), HLD, GERD, BPH, concussion, dementia, expressive aphasia, and malignant melanoma of skin status post WLE and SLN B.   Prior to today's virtual visit, last seen 06/21/2023, however noted to have multiple ER visits and hospital admissions in that timeframe.  Today being seen for requested visit by family enquiring on hospice services with [facility].  Patient had recent ER visit for hematuria and acute UTI requiring treatment with IV medications and Tiwari catheter.  Has since been home home health, however with worsening disposition.  Unable to get out of bed due to weakness, hence virtual visit today.  Dementia described as worsened since last visit.  No longer taking any medications other than Eliquis.   Decreased appetite.  No fevers, chills.  Discussion had with wife regarding request for hospice services and goals of her seeking hospice.  Ultimately due to patient's age, worsening dementia, failure to thrive referral will be placed for hospice.      ENT: Dr. Wheeler  Oncologist: Dr. Acosta  Dermatology: Dr. Keith  Neurologist: Dr. [place]  Cardiologist: Dr. Sharif     Wellness:  06/21/23          MEDICAL HISTORY:    Past Medical History:   Diagnosis Date    A-fib     Brain concussion     CAD (coronary artery disease)     GERD (gastroesophageal reflux disease) 08/19/2024    Malignant melanoma of skin, unspecified     Mild cognitive impairment     Mixed hyperlipidemia     Pulmonary hypertension     Shingles     Sleep apnea, unspecified       Past Surgical History:   Procedure Laterality Date    CATARACT EXTRACTION      CORONARY STENT PLACEMENT      HIP REPLACEMENT ARTHROPLASTY      TOTAL SHOULDER ARTHROPLASTY Bilateral       Social History     Tobacco Use    Smoking status: Never    Smokeless tobacco: Never   Substance Use Topics    Alcohol use: Yes     Alcohol/week: 2.0 standard drinks of alcohol     Types: 1 Glasses of wine, 1 Cans of beer per week          Health Maintenance Due   Topic Date Due    TETANUS VACCINE  Never done    Shingles Vaccine (1 of 2) Never done    RSV Vaccine (Age 60+ and Pregnant patients) (1 - 1-dose 60+ series) Never done    COVID-19 Vaccine (3 - Moderna risk series) 03/29/2021          Patient Care Team:  Aline Montoya MD as PCP - General (Internal Medicine)  Viraj Acosta MD (Family Medicine)  Sharita Johnson MA as ED Navigator      Review of Systems   Constitutional:  Positive for activity change, appetite change and fatigue. Negative for unexpected weight change.   HENT:  Negative for hearing loss, rhinorrhea and trouble swallowing.    Eyes:  Negative for discharge and visual disturbance.   Respiratory:  Negative for chest tightness and wheezing.    Cardiovascular:   Negative for chest pain and palpitations.   Gastrointestinal:  Negative for blood in stool, constipation, diarrhea and vomiting.   Endocrine: Negative for polydipsia and polyuria.   Genitourinary:  Positive for difficulty urinating. Negative for hematuria and urgency.   Musculoskeletal:  Negative for arthralgias, joint swelling and neck pain.   Neurological:  Positive for weakness. Negative for headaches.   Psychiatric/Behavioral:  Positive for confusion. Negative for dysphoric mood.        Objective:   Ht 6' (1.829 m)   Wt 83.9 kg (185 lb)   BMI 25.09 kg/m²      Physical Exam      **No physical exam completed due to telemedicine format    Assessment:       ICD-10-CM ICD-9-CM   1. Failure to thrive in adult  R62.7 783.7   2. Moderate dementia with other behavioral disturbance, unspecified dementia type  F03.B18 294.21   3. Malignant melanoma of skin, unspecified  C43.9 172.9   4. Paroxysmal atrial fibrillation  I48.0 427.31        Plan:     Problem List Items Addressed This Visit          Neuro    Dementia with mood disturbance     -progressively worsened   -no longer taking medications   -significant physical deconditioning and failure to thrive  -referral to hospice evaluation per family request            Cardiac/Vascular    A-fib (Chronic)     -currently on Eliquis, however considering hospice and age recommended discontinuation of Eliquis            Oncology    Malignant melanoma of skin, unspecified (Chronic)    Relevant Orders    Ambulatory referral/consult to Hospice       Endocrine    Failure to thrive in adult - Primary     -progressively worsened   -no longer taking medications   -significant physical deconditioning and failure to thrive  -referral for hospice evaluation per family request         Relevant Orders    Ambulatory referral/consult to Hospice          Follow up for Present to ER/Urgent Care if symtoms worsen.   -plan specifics discussed above    Orders Placed This Encounter    Ambulatory  referral/consult to Hospice        Medication List with Changes/Refills   Current Medications    APIXABAN (ELIQUIS) 2.5 MG TAB    Take 1 tablet (2.5 mg total) by mouth 2 (two) times a day.   Discontinued Medications    ACETAMINOPHEN (TYLENOL) 500 MG TABLET    Take 500 mg by mouth every 6 (six) hours as needed for Pain.    ASPIRIN (ECOTRIN) 81 MG EC TABLET    Take 1 tablet by mouth every morning.    CHOLECALCIFEROL, VITAMIN D3, (VITAMIN D3) 25 MCG (1,000 UNIT) CAPSULE    Take 1,000 Units by mouth Daily.    FUROSEMIDE (LASIX) 20 MG TABLET    Take 20 mg by mouth once daily.    HYDROXYZINE HCL (ATARAX) 10 MG TAB    Take 1 tablet (10 mg total) by mouth 3 (three) times daily as needed (itching).    LIDOCAINE HCL 2% (XYLOCAINE) 2 % JELLY    Apply topically as needed. Apply for mendieta catheters    MEMANTINE (NAMENDA) 5 MG TAB    Take 1 tablet (5 mg total) by mouth 2 (two) times daily.    METOPROLOL SUCCINATE (TOPROL-XL) 50 MG 24 HR TABLET    Take 50 mg by mouth once daily.    MULTIVITAMIN (THERAGRAN) PER TABLET    Take 1 tablet by mouth once daily.    PANTOPRAZOLE (PROTONIX) 40 MG TABLET    Take 40 mg by mouth once daily.    TAMSULOSIN (FLOMAX) 0.4 MG CAP    Take 1 capsule by mouth every morning.

## 2024-08-27 ENCOUNTER — TELEPHONE (OUTPATIENT)
Dept: INTERNAL MEDICINE | Facility: CLINIC | Age: 84
End: 2024-08-27
Payer: MEDICARE

## 2024-08-27 NOTE — ASSESSMENT & PLAN NOTE
-progressively worsened   -no longer taking medications   -significant physical deconditioning and failure to thrive  -referral to hospice evaluation per family request

## 2024-08-27 NOTE — ASSESSMENT & PLAN NOTE
-progressively worsened   -no longer taking medications   -significant physical deconditioning and failure to thrive  -referral for hospice evaluation per family request

## 2024-08-27 NOTE — TELEPHONE ENCOUNTER
----- Message from Angy Hoff sent at 8/27/2024  8:19 AM CDT -----  Regarding: Hospice [facility]  Who Called: Chun with [facility]    Preferred Method of Contact: Phone Call    Patient's Preferred Phone Number on File: [phone]  fax#  [phone]    Best Call Back Number, if different:    Additional Information: Chun called to state they need more information.  Need more office visit notes(stated only received demographics and orders).   Problem: Patient Care Overview  Goal: Plan of Care Review  Outcome: Ongoing (interventions implemented as appropriate)  1430:  Patient tolerated transfusion well, VSS, NAD noted.  Lasix seems to be working, PIV removed intact.  AVS declined, released in stable condition, accompanied by wife.

## 2024-08-29 PROBLEM — F03.93 DEMENTIA WITH MOOD DISTURBANCE: Status: ACTIVE | Noted: 2022-12-31

## 2024-09-05 ENCOUNTER — EXTERNAL HOME HEALTH (OUTPATIENT)
Dept: HOME HEALTH SERVICES | Facility: HOSPITAL | Age: 84
End: 2024-09-05
Payer: MEDICARE

## 2024-09-06 ENCOUNTER — DOCUMENT SCAN (OUTPATIENT)
Dept: HOME HEALTH SERVICES | Facility: HOSPITAL | Age: 84
End: 2024-09-06
Payer: MEDICARE

## 2024-09-12 ENCOUNTER — DOCUMENT SCAN (OUTPATIENT)
Dept: HOME HEALTH SERVICES | Facility: HOSPITAL | Age: 84
End: 2024-09-12
Payer: MEDICARE

## 2024-09-26 ENCOUNTER — TELEPHONE (OUTPATIENT)
Dept: INTERNAL MEDICINE | Facility: CLINIC | Age: 84
End: 2024-09-26
Payer: MEDICARE

## 2024-09-26 NOTE — TELEPHONE ENCOUNTER
----- Message from Roseline Sanchez MA sent at 9/25/2024  2:00 PM CDT -----  Regarding:  Thursday 10-3-24  Wellness Appointment    Fasting wellness labs ordered and ready to do.     Last Wellness 6-21-23